# Patient Record
Sex: MALE | Race: BLACK OR AFRICAN AMERICAN | Employment: FULL TIME | URBAN - METROPOLITAN AREA
[De-identification: names, ages, dates, MRNs, and addresses within clinical notes are randomized per-mention and may not be internally consistent; named-entity substitution may affect disease eponyms.]

---

## 2017-06-06 ENCOUNTER — OFFICE VISIT (OUTPATIENT)
Dept: FAMILY MEDICINE CLINIC | Age: 27
End: 2017-06-06

## 2017-06-06 VITALS
RESPIRATION RATE: 18 BRPM | OXYGEN SATURATION: 98 % | SYSTOLIC BLOOD PRESSURE: 122 MMHG | TEMPERATURE: 98.5 F | WEIGHT: 248 LBS | BODY MASS INDEX: 34.72 KG/M2 | HEIGHT: 71 IN | HEART RATE: 85 BPM | DIASTOLIC BLOOD PRESSURE: 86 MMHG

## 2017-06-06 DIAGNOSIS — Z11.3 SCREENING FOR STD (SEXUALLY TRANSMITTED DISEASE): ICD-10-CM

## 2017-06-06 DIAGNOSIS — Z13.29 SCREENING FOR THYROID DISORDER: ICD-10-CM

## 2017-06-06 DIAGNOSIS — Z00.00 ENCOUNTER FOR ANNUAL PHYSICAL EXAM: Primary | ICD-10-CM

## 2017-06-06 NOTE — PROGRESS NOTES
Chief Complaint   Patient presents with   [address]     Patient in office today to [facility] care; have no c/o.    1. Have you been to the ER, urgent care clinic since your last visit? Hospitalized since your last visit? No    2. Have you seen or consulted any other health care providers outside of the [address] [place] [address] since your last visit? Include any pap smears or colon screening.  No

## 2017-06-06 NOTE — PROGRESS NOTES
Chief Complaint   Patient presents with   [address]     Patient in office today to [facility] care; have no c/o.  1. Have you been to the ER, urgent care clinic since your last visit? Hospitalized since your last visit? No    2. Have you seen or consulted any other health care providers outside of the [address] [place] [address] since your last visit? Include any pap smears or colon screening.  No

## 2017-06-06 NOTE — PROGRESS NOTES
Chief Complaint   Patient presents with   [facility].     Patient in office today to [facility] care; have no c/o.  1. Have you been to the ER, urgent care clinic since your last visit? Hospitalized since your last visit? No    2. Have you seen or consulted any other health care providers outside of the [facility] [place] since your last visit? Include any pap smears or colon screening. No    Pt is looking to establish care. Has not had a physical in some time. Pt works at [employer]. Pt has been trying to make diet changes. Denies any cp, sob, and dyspnea. Denies any ha or dizziness. Denies n/v/c/d. Denies any urinary sx. Denies any smoking. Drinks etoh in moderation, rarely. Last meal was about 1-2 hours ago. Denies any other concerns at this time. Chief Complaint   Patient presents with   [facility].     he is a 27y.o. year old male who presents for evalution. Reviewed PmHx, RxHx, FmHx, SocHx, AllgHx and updated and dated in the chart.     Review of Systems - negative except as listed above in the HPI    Objective:     Vitals:    06/06/17 1721   BP: 122/86   Pulse: 85   Resp: 18   Temp: 98.5 °F (36.9 °C)   TempSrc: Oral   SpO2: 98%   Weight: 248 lb (112.5 kg)   Height: 5' 10.5\" (1.791 m)     Physical Examination: General appearance - alert, well appearing, and in no distress  Mental status - normal mood, behavior, speech, dress, motor activity, and thought processes  Eyes - pupils equal and reactive, extraocular eye movements intact  Ears - bilateral TM's and external ear canals normal  Nose - normal and patent, no erythema, discharge or polyps and normal nontender sinuses  Mouth - mucous membranes moist, pharynx normal without lesions  Neck - supple, no significant adenopathy, carotids upstroke normal bilaterally, no bruits, thyroid exam: thyroid is normal in size without nodules or tenderness  Chest - clear to auscultation, no wheezes, rales or rhonchi, symmetric air entry  Heart - normal rate, regular rhythm, normal S1, S2, no murmurs  Extremities - peripheral pulses normal, no pedal edema, no clubbing or cyanosis  Skin - normal coloration and turgor, no rashes, no suspicious skin lesions noted    Assessment/ Plan:   Cristopher Connor was seen today for establish care. Diagnoses and all orders for this visit:    Encounter for annual physical exam  -     LIPID PANEL  -     METABOLIC PANEL, COMPREHENSIVE  -     CBC WITH AUTOMATED DIFF  RTC fasting for labs only. Will notify results and deviate plan based on findings. Enc to continue with efforts to follow a healthy diet and exercise routinely. Screening for thyroid disorder  -     TSH 3RD GENERATION  Screening, asx. Screening for STD (sexually transmitted disease)  -     HIV 1/2 AG/AB, 4TH GENERATION,W RFLX CONFIRM  -     RPR  -     CHLAMYDIA/GC AMPLIFICATON THINPREP  Screening, asx. Follow-up Disposition:  Return if symptoms worsen or fail to improve. I have discussed the diagnosis with the patient and the intended plan as seen in the above orders. The patient has received an after-visit summary and questions were answered concerning future plans. Medication Side Effects and Warnings were discussed with patient: yes  Patient Labs were reviewed and or requested: no  Patient Past Records were reviewed and or requested  yes  Patient / Caregiver Understanding of treatment plan was verbalized during office visit YES    PO White    There are no Patient Instructions on file for this visit.

## 2017-06-06 NOTE — MR AVS SNAPSHOT
Visit Information Date & Time Provider Department Dept. Phone Encounter #  
 6/6/2017  5:00 PM Sherrill Burkitt, NP [address] [facility] [phone] [id number] Follow-up Instructions Return if symptoms worsen or fail to improve. Upcoming Health Maintenance Date Due DTaP/Tdap/Td series (1 - Tdap) 12/22/2013 INFLUENZA AGE 9 TO ADULT 8/1/2017 Allergies as of 6/6/2017  Review Complete On: 6/6/2017 By: Sherrill Burkitt, NP No Known Allergies Current Immunizations  Never Reviewed No immunizations on file. Not reviewed this visit You Were Diagnosed With   
  
 Codes Comments Encounter for annual physical exam    -  Primary ICD-10-CM: Z00.00 ICD-9-CM: V70.0 Screening for thyroid disorder     ICD-10-CM: Z13.29 ICD-9-CM: V77.0 Vitals BP Pulse Temp Resp Height(growth percentile) Weight(growth percentile) 122/86 (BP 1 Location: Right arm, BP Patient Position: Sitting) 85 98.5 °F (36.9 °C) (Oral) 18 5' 10.5\" (1.791 m) 248 lb (112.5 kg) SpO2 BMI Smoking Status 98% 35.08 kg/m2 Former Smoker Vitals History BMI and BSA Data Body Mass Index Body Surface Area 35.08 kg/m 2 2.37 m 2 Preferred Pharmacy Pharmacy Name Phone [address] Your Updated Medication List  
  
Notice  As of 6/6/2017  5:36 PM  
 You have not been prescribed any medications. We Performed the Following CBC WITH AUTOMATED DIFF [51713 CPT(R)] LIPID PANEL [58877 CPT(R)] METABOLIC PANEL, COMPREHENSIVE [09679 CPT(R)] TSH 3RD GENERATION [14039 CPT(R)] Follow-up Instructions Return if symptoms worsen or fail to improve. Introducing [facility]! [facility] introduces [email or web address] patient portal. Now you can access parts of your medical record, email your doctor's office, and request medication refills online. 1. In your internet browser, go to [email or web address] 2. Click on the First Time User? Click Here link in the Sign In box. You will see the New Member Sign Up page. 3. Enter your Impulcity Access Code exactly as it appears below. You will not need to use this code after youve completed the sign-up process. If you do not sign up before the expiration date, you must request a new code. · Impulcity Access Code: [email or web address] Expires: 9/4/2017  5:23 PM 
 
4. Enter the last four digits of your Social Security Number (xxxx) and Date of Birth (mm/dd/yyyy) as indicated and click Submit. You will be taken to the next sign-up page. 5. Create a Playdemict ID. This will be your Impulcity login ID and cannot be changed, so think of one that is secure and easy to remember. 6. Create a Impulcity password. You can change your password at any time. 7. Enter your Password Reset Question and Answer. This can be used at a later time if you forget your password. 8. Enter your e-mail address. You will receive e-mail notification when new information is available in [address] 9. Click Sign Up. You can now view and download portions of your medical record. 10. Click the Download Summary menu link to download a portable copy of your medical information. If you have questions, please visit the Frequently Asked Questions section of the Impulcity website. Remember, Impulcity is NOT to be used for urgent needs. For medical emergencies, dial 911. Now available from your iPhone and Android! Please provide this summary of care documentation to your next provider. If you have any questions after today's visit, please call [phone].

## 2019-04-17 ENCOUNTER — OFFICE VISIT (OUTPATIENT)
Dept: FAMILY MEDICINE CLINIC | Age: 29
End: 2019-04-17

## 2019-04-17 VITALS
DIASTOLIC BLOOD PRESSURE: 87 MMHG | HEIGHT: 71 IN | WEIGHT: 263.1 LBS | HEART RATE: 83 BPM | BODY MASS INDEX: 36.83 KG/M2 | SYSTOLIC BLOOD PRESSURE: 125 MMHG | RESPIRATION RATE: 18 BRPM | OXYGEN SATURATION: 99 % | TEMPERATURE: 98.3 F

## 2019-04-17 DIAGNOSIS — J30.89 ENVIRONMENTAL AND SEASONAL ALLERGIES: Primary | ICD-10-CM

## 2019-04-17 RX ORDER — CETIRIZINE HCL 10 MG
10 TABLET ORAL DAILY
Qty: 90 TAB | Refills: 2 | Status: SHIPPED | OUTPATIENT
Start: 2019-04-17

## 2019-04-17 NOTE — PATIENT INSTRUCTIONS
Allergies: Care Instructions  Your Care Instructions    Allergies occur when your body's defense system (immune system) overreacts to certain substances. The immune system treats a harmless substance as if it were a harmful germ or virus. Many things can cause this overreaction, including pollens, medicine, food, dust, animal dander, and mold. Allergies can be mild or severe. Mild allergies can be managed with home treatment. But medicine may be needed to prevent problems. Managing your allergies is an important part of staying healthy. Your doctor may suggest that you have allergy testing to help find out what is causing your allergies. When you know what things trigger your symptoms, you can avoid them. This can prevent allergy symptoms and other health problems. For severe allergies that cause reactions that affect your whole body (anaphylactic reactions), your doctor may prescribe a shot of epinephrine to carry with you in case you have a severe reaction. Learn how to give yourself the shot and keep it with you at all times. Make sure it is not . Follow-up care is a key part of your treatment and safety. Be sure to make and go to all appointments, and call your doctor if you are having problems. It's also a good idea to know your test results and keep a list of the medicines you take. How can you care for yourself at home? · If you have been told by your doctor that dust or dust mites are causing your allergy, decrease the dust around your bed:  ? Wash sheets, pillowcases, and other bedding in hot water every week. ? Use dust-proof covers for pillows, duvets, and mattresses. Avoid plastic covers because they tear easily and do not \"breathe. \" Wash as instructed on the label. ? Do not use any blankets and pillows that you do not need. ? Use blankets that you can wash in your washing machine. ? Consider removing drapes and carpets, which attract and hold dust, from your bedroom.   · If you are allergic to house dust and mites, do not use home humidifiers. Your doctor can suggest ways you can control dust and mites. · Look for signs of cockroaches. Cockroaches cause allergic reactions. Use cockroach baits to get rid of them. Then, clean your home well. Cockroaches like areas where grocery bags, newspapers, empty bottles, or cardboard boxes are stored. Do not keep these inside your home, and keep trash and food containers sealed. Seal off any spots where cockroaches might enter your home. · If you are allergic to mold, get rid of furniture, rugs, and drapes that smell musty. Check for mold in the bathroom. · If you are allergic to outdoor pollen or mold spores, use air-conditioning. Change or clean all filters every month. Keep windows closed. · If you are allergic to pollen, stay inside when pollen counts are high. Use a vacuum  with a HEPA filter or a double-thickness filter at least two times each week. · Stay inside when air pollution is bad. Avoid paint fumes, perfumes, and other strong odors. · Avoid conditions that make your allergies worse. Stay away from smoke. Do not smoke or let anyone else smoke in your house. Do not use fireplaces or wood-burning stoves. · If you are allergic to your pets, change the air filter in your furnace every month. Use high-efficiency filters. · If you are allergic to pet dander, keep pets outside or out of your bedroom. Old carpet and cloth furniture can hold a lot of animal dander. You may need to replace them. When should you call for help? Give an epinephrine shot if:    · You think you are having a severe allergic reaction.     · You have symptoms in more than one body area, such as mild nausea and an itchy mouth.    After giving an epinephrine shot call 911, even if you feel better.   Call 911 if:    · You have symptoms of a severe allergic reaction. These may include:  ? Sudden raised, red areas (hives) all over your body. ?  Swelling of the throat, mouth, lips, or tongue. ? Trouble breathing. ? Passing out (losing consciousness). Or you may feel very lightheaded or suddenly feel weak, confused, or restless.     · You have been given an epinephrine shot, even if you feel better.    Call your doctor now or seek immediate medical care if:    · You have symptoms of an allergic reaction, such as:  ? A rash or hives (raised, red areas on the skin). ? Itching. ? Swelling. ? Belly pain, nausea, or vomiting.    Watch closely for changes in your health, and be sure to contact your doctor if:    · You do not get better as expected. Where can you learn more? Go to http://fausto-gayla.info/. Enter U573 in the search box to learn more about \"Allergies: Care Instructions. \"  Current as of: June 27, 2018  Content Version: 11.9  © 4318-6748 Healthwise, [employer]. Care instructions adapted under license by [employer] (which disclaims liability or warranty for this information). If you have questions about a medical condition or this instruction, always ask your healthcare professional. [address] any warranty or liability for your use of this information.

## 2019-04-17 NOTE — PROGRESS NOTES
Chief Complaint   Patient presents with    Headache     pt states, since pollen started up. Has not been taking anything OTC. Palpated sinus areas and slight pain. he is a 34y.o. year old male who presents for evalution. Pt states has been having bad headaches for past week. Mostly in frontal area. No fever or chills. Has not tried any OTCs. Course is constant. Reviewed PmHx, RxHx, FmHx, SocHx, AllgHx and updated and dated in the chart. Review of Systems - negative except as listed above in the HPI    Objective:     Vitals:    04/17/19 0851   BP: 125/87   Pulse: 83   Resp: 18   Temp: 98.3 °F (36.8 °C)   TempSrc: Oral   SpO2: 99%   Weight: 263 lb 1.6 oz (119.3 kg)   Height: 5' 10.5\" (1.791 m)     Physical Examination: General appearance - alert, well appearing, and in no distress  Ears - bilateral TM's and external ear canals normal  Nose - normal nontender sinuses, mucosal congestion and mucosal pallor  Mouth - mucous membranes moist, pharynx normal without lesions  Neck - supple, no significant adenopathy  Chest - clear to auscultation, no wheezes, rales or rhonchi, symmetric air entry  Heart - normal rate, regular rhythm, normal S1, S2, no murmurs, rubs, clicks or gallops    Assessment/ Plan:   Diagnoses and all orders for this visit:    1. Environmental and seasonal allergies  -     cetirizine (ZYRTEC) 10 mg tablet; Take 1 Tab by mouth daily. New rx. F/U prn     Pt voiced understanding regarding plan of care. Follow-up and Dispositions    · Return if symptoms worsen or fail to improve. I have discussed the diagnosis with the patient and the intended plan as seen in the above orders. The patient has received an after-visit summary and questions were answered concerning future plans.      Medication Side Effects and Warnings were discussed with patient    Jeffery Vital NP

## 2019-04-17 NOTE — PROGRESS NOTES
Identified pt with two pt identifiers(name and ). Reviewed record in preparation for visit and have obtained necessary documentation. All patient medications has been reviewed. Chief Complaint   Patient presents with    Headache     pt states, since pollen started up. Has not been taking anything OTC. Palpated sinus areas and slight pain. Health Maintenance Due   Topic    DTaP/Tdap/Td series (1 - Tdap)       Vitals:    19 0851   BP: 125/87   Pulse: 83   Resp: 18   Temp: 98.3 °F (36.8 °C)   TempSrc: Oral   SpO2: 99%   Weight: 263 lb 1.6 oz (119.3 kg)   Height: 5' 10.5\" (1.791 m)   PainSc:   0 - No pain       Coordination of Care Questionnaire:  :   1) Have you been to an emergency room, urgent care, or hospitalized since your last visit?   no       2. Have seen or consulted any other health care provider since your last visit? NO    3) Do you have an Advanced Directive/ Living Will in place? NO  If yes, do we have a copy on file NO  If no, would you like information NO    Patient is accompanied by self I have received verbal consent from Ebonie Schmidt to discuss any/all medical information while they are present in the room.

## 2020-10-13 ENCOUNTER — OFFICE VISIT (OUTPATIENT)
Dept: FAMILY MEDICINE CLINIC | Age: 30
End: 2020-10-13
Payer: COMMERCIAL

## 2020-10-13 VITALS
HEIGHT: 71 IN | BODY MASS INDEX: 37.94 KG/M2 | OXYGEN SATURATION: 99 % | HEART RATE: 87 BPM | RESPIRATION RATE: 16 BRPM | SYSTOLIC BLOOD PRESSURE: 138 MMHG | WEIGHT: 271 LBS | DIASTOLIC BLOOD PRESSURE: 83 MMHG | TEMPERATURE: 98.8 F

## 2020-10-13 DIAGNOSIS — E66.01 SEVERE OBESITY (HCC): ICD-10-CM

## 2020-10-13 DIAGNOSIS — I10 ESSENTIAL HYPERTENSION: ICD-10-CM

## 2020-10-13 DIAGNOSIS — Z13.1 SCREENING FOR DIABETES MELLITUS: ICD-10-CM

## 2020-10-13 DIAGNOSIS — Z13.6 SCREENING FOR CARDIOVASCULAR CONDITION: ICD-10-CM

## 2020-10-13 DIAGNOSIS — Z00.00 ANNUAL PHYSICAL EXAM: Primary | ICD-10-CM

## 2020-10-13 PROCEDURE — 99385 PREV VISIT NEW AGE 18-39: CPT | Performed by: FAMILY MEDICINE

## 2020-10-13 RX ORDER — ACETAMINOPHEN 500 MG
TABLET ORAL
Qty: 1 KIT | Refills: 0 | Status: SHIPPED | OUTPATIENT
Start: 2020-10-13

## 2020-10-13 NOTE — PATIENT INSTRUCTIONS
Low Sodium Diet (2,000 Milligram): Care Instructions  Your Care Instructions     Too much sodium causes your body to hold on to extra water. This can raise your blood pressure and force your heart and kidneys to work harder. In very serious cases, this could cause you to be put in the hospital. It might even be life-threatening. By limiting sodium, you will feel better and lower your risk of serious problems. The most common source of sodium is salt. People get most of the salt in their diet from canned, prepared, and packaged foods. Fast food and restaurant meals also are very high in sodium. Your doctor will probably limit your sodium to less than 2,000 milligrams (mg) a day. This limit counts all the sodium in prepared and packaged foods and any salt you add to your food. Follow-up care is a key part of your treatment and safety. Be sure to make and go to all appointments, and call your doctor if you are having problems. It's also a good idea to know your test results and keep a list of the medicines you take. How can you care for yourself at home? Read food labels  · Read labels on cans and food packages. The labels tell you how much sodium is in each serving. Make sure that you look at the serving size. If you eat more than the serving size, you have eaten more sodium. · Food labels also tell you the Percent Daily Value for sodium. Choose products with low Percent Daily Values for sodium. · Be aware that sodium can come in forms other than salt, including monosodium glutamate (MSG), sodium citrate, and sodium bicarbonate (baking soda). MSG is often added to Asian food. When you eat out, you can sometimes ask for food without MSG or added salt. Buy low-sodium foods  · Buy foods that are labeled \"unsalted\" (no salt added), \"sodium-free\" (less than 5 mg of sodium per serving), or \"low-sodium\" (less than 140 mg of sodium per serving).  Foods labeled \"reduced-sodium\" and \"light sodium\" may still have too much sodium. Be sure to read the label to see how much sodium you are getting. · Buy fresh vegetables, or frozen vegetables without added sauces. Buy low-sodium versions of canned vegetables, soups, and other canned goods. Prepare low-sodium meals  · Cut back on the amount of salt you use in cooking. This will help you adjust to the taste. Do not add salt after cooking. One teaspoon of salt has about 2,300 mg of sodium. · Take the salt shaker off the table. · Flavor your food with garlic, lemon juice, onion, vinegar, herbs, and spices. Do not use soy sauce, lite soy sauce, steak sauce, onion salt, garlic salt, celery salt, mustard, or ketchup on your food. · Use low-sodium salad dressings, sauces, and ketchup. Or make your own salad dressings and sauces without adding salt. · Use less salt (or none) when recipes call for it. You can often use half the salt a recipe calls for without losing flavor. Other foods such as rice, pasta, and grains do not need added salt. · Rinse canned vegetables, and cook them in fresh water. This removes some--but not all--of the salt. · Avoid water that is naturally high in sodium or that has been treated with water softeners, which add sodium. Call your local water company to find out the sodium content of your water supply. If you buy bottled water, read the label and choose a sodium-free brand. Avoid high-sodium foods  · Avoid eating:  ? Smoked, cured, salted, and canned meat, fish, and poultry. ? Ham, syed, hot dogs, and luncheon meats. ? Regular, hard, and processed cheese and regular peanut butter. ? Crackers with salted tops, and other salted snack foods such as pretzels, chips, and salted popcorn. ? Frozen prepared meals, unless labeled low-sodium. ? Canned and dried soups, broths, and bouillon, unless labeled sodium-free or low-sodium. ? Canned vegetables, unless labeled sodium-free or low-sodium. ? Western Karine fries, pizza, tacos, and other fast foods.   ? Daniela Marrow, olives, ketchup, and other condiments, especially soy sauce, unless labeled sodium-free or low-sodium. Where can you learn more? Go to http://www.gray.com/  Enter V843 in the search box to learn more about \"Low Sodium Diet (2,000 Milligram): Care Instructions. \"  Current as of: August 22, 2019               Content Version: 12.6  © 4311-8573 [employer]. Care instructions adapted under license by [employer] (which disclaims liability or warranty for this information). If you have questions about a medical condition or this instruction, always ask your healthcare professional. [address] any warranty or liability for your use of this information.

## 2020-10-13 NOTE — PROGRESS NOTES
Identified pt with two pt identifiers(name and ). Reviewed record in preparation for visit and have obtained necessary documentation. Chief Complaint   Patient presents with   [facility] Establish Care    Other     Discuss bp issues        Health Maintenance Due   Topic    DTaP/Tdap/Td series (1 - Tdap)    Flu Vaccine (1)   Pt declined flu vaccine    Coordination of Care Questionnaire:  :   1) Have you been to an emergency room, urgent care, or hospitalized since your last visit? If yes, where when, and reason for visit? Yes, [address] for High Blood Pressure      2. Have seen or consulted any other health care provider since your last visit? If yes, where when, and reason for visit?  no        Patient is accompanied by self I have received verbal consent from Jossue Laguna to discuss any/all medical information while they are present in the room.

## 2020-10-13 NOTE — PROGRESS NOTES
Darryl Rivero is a 34 y.o. male  Presenting for his annual checkup and health maintenance review and follow-up  Overall, doing well. Blood pressure concerns today. Last colonoscopy never. UTD on dental and vision. Not UTD on influenza. Patient is getting no exercise. Trying to eat a well balanced diet. Health Maintenance   Topic    DTaP/Tdap/Td series (1 - Tdap)    Flu Vaccine (1)    Pneumococcal 0-64 years        Health Maintenance reviewed        Vaccinations reviewed    There is no immunization history on file for this patient. History reviewed. No pertinent past medical history. has no past surgical history on file. Patient has no known allergies. Current Outpatient Medications   Medication Sig    Blood Pressure Test Kit-Large ([employer] Arm BP Monitor) kit Check bp daily    cetirizine (ZYRTEC) 10 mg tablet Take 1 Tab by mouth daily. No current facility-administered medications for this visit. SOCIAL HX:  reports that he has quit smoking. He has never used smokeless tobacco. He reports current alcohol use. He reports that he does not use drugs. FAMILY HX: family history includes Diabetes in his father; No Known Problems in his mother. Review of Systems   Constitutional: Negative. HENT: Negative. Eyes: Negative. Respiratory: Negative. Cardiovascular: Negative. Gastrointestinal: Negative. Genitourinary: Negative. Musculoskeletal: Negative. Skin: Negative. Neurological: Negative. Endo/Heme/Allergies: Negative. Psychiatric/Behavioral: Negative. Physical Exam  Vitals signs and nursing note reviewed. HENT:      Head: Normocephalic and atraumatic. Right Ear: Tympanic membrane, ear canal and external ear normal.      Left Ear: Tympanic membrane, ear canal and external ear normal.      Nose: Nose normal.      Mouth/Throat:      Pharynx: No oropharyngeal exudate. Eyes:      Extraocular Movements: Extraocular movements intact. Conjunctiva/sclera: Conjunctivae normal.      Pupils: Pupils are equal, round, and reactive to light. Neck:      Musculoskeletal: Normal range of motion and neck supple. Thyroid: No thyromegaly. Cardiovascular:      Rate and Rhythm: Normal rate and regular rhythm. Pulmonary:      Effort: Pulmonary effort is normal.      Breath sounds: Normal breath sounds. Abdominal:      General: Bowel sounds are normal. There is no distension. Palpations: Abdomen is soft. Tenderness: There is no abdominal tenderness. Musculoskeletal: Normal range of motion. Right lower leg: No edema. Left lower leg: No edema. Lymphadenopathy:      Cervical: No cervical adenopathy. Skin:     General: Skin is warm and dry. Neurological:      Mental Status: He is alert and oriented to person, place, and time. Psychiatric:         Mood and Affect: Mood and affect normal.            Assessment/Plan    1. Severe obesity ([address]      2. Annual physical exam    - LIPID PANEL; Future  - URINALYSIS W/ RFLX MICROSCOPIC; Future  - CBC WITH AUTOMATED DIFF; Future  - METABOLIC PANEL, COMPREHENSIVE; Future  - THYROID CASCADE PROFILE; Future    3. Screening for cardiovascular condition    - LIPID PANEL; Future    4. Screening for diabetes mellitus    - METABOLIC PANEL, COMPREHENSIVE; Future    5. Essential hypertension    Patient reports he went to [facility] ER 3 weeks ago to get COVID testing done. His blood pressure was checked there. Blood pressure reading was 187/85. He was told to follow-up with PCP. He does not monitor his blood pressure at home. His blood pressure today at the office is stable. Mr. Rupali Castillo has been given the following recommendations today due to his elevated BP reading: rescreen BP within a minimum of 2 weeks, lifestyle modifications to include: dietary sodium restriction and increase physical activity and lab tests ordered. - URINALYSIS W/ RFLX MICROSCOPIC;  Future  - METABOLIC PANEL, COMPREHENSIVE; Future  - THYROID CASCADE PROFILE; Future  - Blood Pressure Test Kit-Large ([employer] Arm BP Monitor) kit; Check bp daily  Dispense: 1 Kit; Refill: 0          34 y.o. male who presents today for annual exam. UTD on screening. Not UTD on vaccines. Will check routine labs. Encouraged daily exercise and trying to eat a well balanced diet. I have discussed the diagnosis with the patient and the intended plan as seen in the above orders. The patient has received an after-visit summary and questions were answered concerning future plans. I have discussed medication side effects and warnings with the patient as well. The patient agrees and understands above plan. Follow-up and Dispositions    · Return in about 2 weeks (around 10/27/2020) for BP.               Isael Moseley MD

## 2022-03-18 PROBLEM — E66.01 SEVERE OBESITY (HCC): Status: ACTIVE | Noted: 2020-10-13

## 2022-11-16 ENCOUNTER — NURSE TRIAGE (OUTPATIENT)
Dept: OTHER | Facility: CLINIC | Age: 32
End: 2022-11-16

## 2022-11-16 NOTE — TELEPHONE ENCOUNTER
Location of patient: [address] [facility]  call from [place] at [facility] with [employer]. Subjective: Caller states \"I have high blood pressure readings\"     Current Symptoms: BP yesterday was 153/111, when he randomly checked it. Not able to take BP at time of call. Headaches on and off. No headache, chest pain or vision changes. Currently asymptomatic. Does not take BP meds. Onset: several months ago; intermittent    Associated Symptoms: NA    Pain Severity: 0/10; ;     Temperature: denies fever     What has been tried:     LMP: NA Pregnant: NA    Recommended disposition: See in Office Today    Care advice provided, patient verbalizes understanding; denies any other questions or concerns; instructed to call back for any new or worsening symptoms. Patient/Caller agrees with recommended disposition; writer provided warm transfer to [facility] at [facility] for appointment scheduling    Attention Provider: Thank you for allowing me to participate in the care of your patient. The patient was connected to triage in response to information provided to the [facility]. Please do not respond through this encounter as the response is not directed to a shared pool.     Reason for Disposition   Systolic BP >= 428 OR Diastolic >= 363    Protocols used: Blood Pressure - High-ADULT-[place]

## 2022-11-18 ENCOUNTER — OFFICE VISIT (OUTPATIENT)
Dept: FAMILY MEDICINE CLINIC | Age: 32
End: 2022-11-18
Payer: COMMERCIAL

## 2022-11-18 VITALS
WEIGHT: 268 LBS | BODY MASS INDEX: 37.52 KG/M2 | HEIGHT: 71 IN | DIASTOLIC BLOOD PRESSURE: 108 MMHG | HEART RATE: 100 BPM | SYSTOLIC BLOOD PRESSURE: 160 MMHG | OXYGEN SATURATION: 98 % | TEMPERATURE: 97 F | RESPIRATION RATE: 16 BRPM

## 2022-11-18 DIAGNOSIS — G47.30 SLEEP APNEA, UNSPECIFIED TYPE: ICD-10-CM

## 2022-11-18 DIAGNOSIS — E66.9 OBESITY (BMI 30-39.9): ICD-10-CM

## 2022-11-18 DIAGNOSIS — I10 ESSENTIAL HYPERTENSION: Primary | ICD-10-CM

## 2022-11-18 PROCEDURE — 3079F DIAST BP 80-89 MM HG: CPT | Performed by: STUDENT IN AN ORGANIZED HEALTH CARE EDUCATION/TRAINING PROGRAM

## 2022-11-18 PROCEDURE — 99214 OFFICE O/P EST MOD 30 MIN: CPT | Performed by: STUDENT IN AN ORGANIZED HEALTH CARE EDUCATION/TRAINING PROGRAM

## 2022-11-18 PROCEDURE — 3074F SYST BP LT 130 MM HG: CPT | Performed by: STUDENT IN AN ORGANIZED HEALTH CARE EDUCATION/TRAINING PROGRAM

## 2022-11-18 RX ORDER — AMLODIPINE BESYLATE 5 MG/1
5 TABLET ORAL DAILY
Qty: 30 TABLET | Refills: 1 | Status: SHIPPED | OUTPATIENT
Start: 2022-11-18

## 2022-11-18 NOTE — PROGRESS NOTES
Assessment/Plan:     Diagnoses and all orders for this visit:    1. Essential hypertension  -     SLEEP MEDICINE REFERRAL  -     amLODIPine (NORVASC) 5 mg tablet; Take 1 Tablet by mouth daily.  -     MICROALBUMIN, UR, RAND W/ MICROALB/CREAT RATIO; Future  -     METABOLIC PANEL, COMPREHENSIVE; Future  -     CBC W/O DIFF; Future  -     TSH 3RD GENERATION; Future  -Patient has had a previous elevated blood pressure reading as well as elevated readings at the dentist office and at home, along with an elevated reading today. Therefore consistent with essential hypertension.  -Given how significantly elevated blood pressure is today will start amlodipine 5 mg daily.  -Patient is currently asymptomatic.  -Check routine lab work today.  -Also suspect patient likely has sleep apnea and this can contribute to his hypertension. Therefore referred to sleep medicine  -Follow-up in about 2 to 3 weeks for reevaluation or sooner if needed    2. Obesity (BMI 30-39.9)  -     LIPID PANEL; Future  -     HEMOGLOBIN A1C WITH EAG; Future  -Encouraged dietary and lifestyle modifications to promote weight loss    3. Sleep apnea, unspecified type  -     SLEEP MEDICINE REFERRAL  -Patient has been having some brief morning time headaches intermittently along with symptoms consistent with sleep apnea. -Refer to sleep medicine for a sleep study. Follow-up and Dispositions    Return in about 3 weeks (around 12/9/2022), or if symptoms worsen or fail to improve. Discussed expected course/resolution/complications of diagnosis in detail with patient. Medication risks/benefits/costs/interactions/alternatives discussed with patient. Pt expressed understanding with the diagnosis and plan. Subjective:      Naya Santana is a 36 y.o. male who presents for had concerns including Elevated Blood Pressure and Headache (Patient report waking up with Headaches. ).      Current Outpatient Medications   Medication Sig Dispense Refill amLODIPine (NORVASC) 5 mg tablet Take 1 Tablet by mouth daily. 30 Tablet 1       No Known Allergies  History reviewed. No pertinent past medical history. No past surgical history on file. Family History   Problem Relation Age of Onset    Hypertension Mother     Diabetes Father      Social History     Socioeconomic History    Marital status: SINGLE     Spouse name: Not on file    Number of children: Not on file    Years of education: Not on file    Highest education level: Not on file   Occupational History    Not on file   Tobacco Use    Smoking status: Former    Smokeless tobacco: Never   Substance and Sexual Activity    Alcohol use: Not Currently     Comment: socially    Drug use: No    Sexual activity: Not Currently   Other Topics Concern    Not on file   Social History Narrative    Not on file     Social Determinants of Health     Financial Resource Strain: Not on file   Food Insecurity: Not on file   Transportation Needs: Not on file   Physical Activity: Not on file   Stress: Not on file   Social Connections: Not on file   Intimate Partner Violence: Not on file   Housing Stability: Not on file       Patient is a 72-year-old male who presents to the office today due to concerns of elevated blood pressure readings. Patient states that at his last doctor's appointment 2 years ago he did initially have an elevated blood pressure reading that decreased. He did also endorse an elevated blood pressure reading at his dentist office 2 months ago. Also his girlfriend had checked his blood pressure recently and was noted to be elevated at home. He does also endorse some morning headaches. He states that these do not occur every morning but occasionally and usually only last about 15 minutes. He does state that he gets poor sleep and has been snoring and his girlfriend has noted apnea events. He does not wake up feeling refreshed.   He denies any current chest pain, headache, dizziness, numbness, tingling or weakness. He states when he has the headache it resolves quickly and has no other neurological signs or symptoms. ROS:   Review of Systems   Constitutional:  Negative for chills and fever. HENT:  Negative for congestion. Respiratory:  Negative for cough and shortness of breath. Cardiovascular:  Negative for chest pain and leg swelling. Gastrointestinal:  Negative for abdominal pain, nausea and vomiting. Neurological:  Positive for headaches. Negative for dizziness, tingling and weakness. Objective:   Visit Vitals  BP (!) 160/108   Pulse 100   Temp 97 °F (36.1 °C) (Skin)   Resp 16   Ht 5' 11\" (1.803 m)   Wt 268 lb (121.6 kg)   SpO2 98%   BMI 37.38 kg/m²         Vitals and Nurse Documentation reviewed. Physical Exam  Constitutional:       General: He is not in acute distress. Appearance: Normal appearance. He is obese. He is not toxic-appearing. HENT:      Head: Normocephalic and atraumatic. Nose: Nose normal.      Mouth/Throat:      Mouth: Mucous membranes are moist.      Pharynx: Oropharynx is clear. No oropharyngeal exudate or posterior oropharyngeal erythema. Eyes:      Extraocular Movements: Extraocular movements intact. Conjunctiva/sclera: Conjunctivae normal.      Pupils: Pupils are equal, round, and reactive to light. Cardiovascular:      Rate and Rhythm: Normal rate and regular rhythm. Pulses: Normal pulses. Heart sounds: Normal heart sounds. No murmur heard. No gallop. Pulmonary:      Effort: Pulmonary effort is normal. No respiratory distress. Breath sounds: Normal breath sounds. No wheezing or rhonchi. Abdominal:      General: Bowel sounds are normal. There is no distension. Palpations: Abdomen is soft. Tenderness: There is no abdominal tenderness. There is no guarding. Musculoskeletal:      Cervical back: Neck supple. Right lower leg: No edema. Left lower leg: No edema.    Neurological:      General: No focal deficit present. Mental Status: He is alert and oriented to person, place, and time. Cranial Nerves: Cranial nerves 2-12 are intact. Sensory: Sensation is intact. Motor: Motor function is intact. Coordination: Coordination is intact. Coordination normal. Finger-Nose-Finger Test and Heel to Rush Code Test normal.      Gait: Gait is intact. Gait normal.      Deep Tendon Reflexes:      Reflex Scores:       Bicep reflexes are 2+ on the right side and 2+ on the left side. Patellar reflexes are 2+ on the right side and 2+ on the left side. No results found for this or any previous visit.

## 2022-11-18 NOTE — PROGRESS NOTES
1. Have you been to the ER, urgent care clinic since your last visit? Hospitalized since your last visit? No    2. Have you seen or consulted any other health care providers outside of the [address] [place] [address] since your last visit? Include any pap smears or colon screening. No  Chief Complaint   Patient presents with    Elevated Blood Pressure    Headache     Patient report waking up with Headaches. Health Maintenance Due   Topic Date Due    Hepatitis C Screening  Never done    Depression Screen  Never done    COVID-19 Vaccine (1) Never done    DTaP/Tdap/Td series (1 - Tdap) Never done    Flu Vaccine (1) Never done     3 most recent PHQ Screens 11/18/2022   Little interest or pleasure in doing things Not at all   Feeling down, depressed, irritable, or hopeless Not at all   Total Score PHQ 2 0     Abuse Screening Questionnaire 11/18/2022   Do you ever feel afraid of your partner? N   Are you in a relationship with someone who physically or mentally threatens you? N   Is it safe for you to go home?  Y     Visit Vitals  BP (!) 153/101 (BP 1 Location: Left upper arm, BP Patient Position: Sitting, BP Cuff Size: Adult)   Pulse 100   Temp 97 °F (36.1 °C) (Skin)   Resp 16   Ht 5' 11\" (1.803 m)   Wt 268 lb (121.6 kg)   SpO2 98%   BMI 37.38 kg/m²

## 2022-11-23 PROBLEM — R73.03 PREDIABETES: Status: ACTIVE | Noted: 2022-11-23

## 2022-11-23 PROBLEM — I10 ESSENTIAL HYPERTENSION: Status: ACTIVE | Noted: 2022-11-23

## 2022-11-23 PROBLEM — E78.00 HYPERCHOLESTEROLEMIA: Status: ACTIVE | Noted: 2022-11-23

## 2022-12-08 ENCOUNTER — OFFICE VISIT (OUTPATIENT)
Dept: FAMILY MEDICINE CLINIC | Age: 32
End: 2022-12-08
Payer: COMMERCIAL

## 2022-12-08 ENCOUNTER — PATIENT MESSAGE (OUTPATIENT)
Dept: SLEEP MEDICINE | Age: 32
End: 2022-12-08

## 2022-12-08 VITALS
DIASTOLIC BLOOD PRESSURE: 86 MMHG | BODY MASS INDEX: 37.71 KG/M2 | WEIGHT: 270.4 LBS | HEART RATE: 103 BPM | TEMPERATURE: 97.9 F | SYSTOLIC BLOOD PRESSURE: 138 MMHG | RESPIRATION RATE: 16 BRPM | OXYGEN SATURATION: 97 %

## 2022-12-08 DIAGNOSIS — I10 ESSENTIAL HYPERTENSION: Primary | ICD-10-CM

## 2022-12-08 DIAGNOSIS — E66.01 SEVERE OBESITY (HCC): ICD-10-CM

## 2022-12-08 PROCEDURE — 3074F SYST BP LT 130 MM HG: CPT | Performed by: STUDENT IN AN ORGANIZED HEALTH CARE EDUCATION/TRAINING PROGRAM

## 2022-12-08 PROCEDURE — 99213 OFFICE O/P EST LOW 20 MIN: CPT | Performed by: STUDENT IN AN ORGANIZED HEALTH CARE EDUCATION/TRAINING PROGRAM

## 2022-12-08 PROCEDURE — 3079F DIAST BP 80-89 MM HG: CPT | Performed by: STUDENT IN AN ORGANIZED HEALTH CARE EDUCATION/TRAINING PROGRAM

## 2022-12-08 RX ORDER — AMLODIPINE BESYLATE 5 MG/1
5 TABLET ORAL DAILY
Qty: 90 TABLET | Refills: 1 | Status: SHIPPED | OUTPATIENT
Start: 2022-12-08

## 2022-12-08 NOTE — PROGRESS NOTES
1. Have you been to the ER, urgent care clinic since your last visit? Hospitalized since your last visit? No    2. Have you seen or consulted any other health care providers outside of the [address] [place] [address] since your last visit? Include any pap smears or colon screening.  No        Chief Complaint   Patient presents with    Follow-up    Other     Wants to get enrolled in the weight management program

## 2022-12-08 NOTE — PROGRESS NOTES
Assessment/Plan:     Diagnoses and all orders for this visit:    1. Essential hypertension  -     amLODIPine (NORVASC) 5 mg tablet; Take 1 Tablet by mouth daily.  -Chronic. Well-controlled. -Continue on current dose of amlodipine 5 mg daily.  -Recommend follow-up in 3 to 4 months for reevaluation or sooner if needed    2. Severe obesity ([address]  -     REFERRAL TO WEIGHT LOSS  -Encouraged continued dietary and lifestyle modifications.  -Patient is interested in weight loss management referral.  Referral placed      Follow-up and Dispositions    Return in about 4 months (around 4/8/2023), or if symptoms worsen or fail to improve. Discussed expected course/resolution/complications of diagnosis in detail with patient. Medication risks/benefits/costs/interactions/alternatives discussed with patient. Pt expressed understanding with the diagnosis and plan. Subjective:      Miguel Howard is a 36 y.o. male who presents for had concerns including Follow-up and Other (Wants to get enrolled in the weight management program ). Current Outpatient Medications   Medication Sig Dispense Refill    amLODIPine (NORVASC) 5 mg tablet Take 1 Tablet by mouth daily. 90 Tablet 1       No Known Allergies  No past medical history on file. No past surgical history on file.   Family History   Problem Relation Age of Onset    Hypertension Mother     Diabetes Father      Social History     Socioeconomic History    Marital status: SINGLE     Spouse name: Not on file    Number of children: Not on file    Years of education: Not on file    Highest education level: Not on file   Occupational History    Not on file   Tobacco Use    Smoking status: Former    Smokeless tobacco: Never   Substance and Sexual Activity    Alcohol use: Not Currently     Comment: socially    Drug use: No    Sexual activity: Not Currently   Other Topics Concern    Not on file   Social History Narrative    Not on file     Social Determinants of Health Financial Resource Strain: Not on file   Food Insecurity: Not on file   Transportation Needs: Not on file   Physical Activity: Not on file   Stress: Not on file   Social Connections: Not on file   Intimate Partner Violence: Not on file   Housing Stability: Not on file       Patient is a 82-year-old male who presents the office today for follow-up of hypertension. Patient was recently started on amlodipine 5 mg daily at last office visit. Patient states that he has been checking his home blood pressure and usually notes about 982S systolic and less than 31S diastolic. Patient also states he is interested in possible weight loss management referral as he would like to work on losing weight to help improve his BP. He denies any acute concerns today. ROS:   Review of Systems   Constitutional:  Negative for chills and fever. HENT:  Negative for congestion. Respiratory:  Negative for cough and shortness of breath. Cardiovascular:  Negative for chest pain and leg swelling. Gastrointestinal:  Negative for abdominal pain, nausea and vomiting. Neurological:  Negative for dizziness, tingling, weakness and headaches. Objective:   Visit Vitals  /86   Pulse (!) 103   Temp 97.9 °F (36.6 °C) (Temporal)   Resp 16   Wt 270 lb 6.4 oz (122.7 kg)   SpO2 97%   BMI 37.71 kg/m²         Vitals and Nurse Documentation reviewed. Physical Exam  Constitutional:       General: He is not in acute distress. Appearance: Normal appearance. He is obese. He is not toxic-appearing. HENT:      Head: Normocephalic and atraumatic. Eyes:      Conjunctiva/sclera: Conjunctivae normal.   Cardiovascular:      Rate and Rhythm: Normal rate and regular rhythm. Pulses: Normal pulses. Heart sounds: Normal heart sounds. No murmur heard. No gallop. Pulmonary:      Effort: Pulmonary effort is normal. No respiratory distress. Breath sounds: Normal breath sounds. No wheezing or rhonchi.    Abdominal: General: Bowel sounds are normal. There is no distension. Palpations: Abdomen is soft. Tenderness: There is no abdominal tenderness. There is no guarding. Musculoskeletal:      Right lower leg: No edema. Left lower leg: No edema. Neurological:      Mental Status: He is alert and oriented to person, place, and time. Gait: Gait normal.       Results for orders placed or performed in visit on 11/21/22   HEMOGLOBIN A1C WITH EAG   Result Value Ref Range    Hemoglobin A1c 5.7 (H) 4.0 - 5.6 %    Est. average glucose 117 mg/dL   TSH 3RD GENERATION   Result Value Ref Range    TSH 1.07 0.36 - 3.74 uIU/mL   CBC W/O DIFF   Result Value Ref Range    WBC 5.2 4.1 - 11.1 K/uL    RBC 5.43 4.10 - 5.70 M/uL    HGB 15.3 12.1 - 17.0 g/dL    HCT 47.6 36.6 - 50.3 %    MCV 87.7 80.0 - 99.0 FL    MCH 28.2 26.0 - 34.0 PG    MCHC 32.1 30.0 - 36.5 g/dL    RDW 13.7 11.5 - 14.5 %    PLATELET 308 884 - 721 K/uL    MPV 10.1 8.9 - 12.9 FL    NRBC 0.0 0  WBC    ABSOLUTE NRBC 0.00 0.00 - 6.96 K/uL   METABOLIC PANEL, COMPREHENSIVE   Result Value Ref Range    Sodium 138 136 - 145 mmol/L    Potassium 4.5 3.5 - 5.1 mmol/L    Chloride 104 97 - 108 mmol/L    CO2 30 21 - 32 mmol/L    Anion gap 4 (L) 5 - 15 mmol/L    Glucose 84 65 - 100 mg/dL    BUN 10 6 - 20 MG/DL    Creatinine 1.01 0.70 - 1.30 MG/DL    BUN/Creatinine ratio 10 (L) 12 - 20      eGFR >60 >60 ml/min/1.73m2    Calcium 9.7 8.5 - 10.1 MG/DL    Bilirubin, total 0.7 0.2 - 1.0 MG/DL    ALT (SGPT) 55 12 - 78 U/L    AST (SGOT) 36 15 - 37 U/L    Alk.  phosphatase 61 45 - 117 U/L    Protein, total 8.2 6.4 - 8.2 g/dL    Albumin 4.5 3.5 - 5.0 g/dL    Globulin 3.7 2.0 - 4.0 g/dL    A-G Ratio 1.2 1.1 - 2.2     MICROALBUMIN, UR, RAND W/ MICROALB/CREAT RATIO   Result Value Ref Range    Microalbumin,urine random 5.04 MG/DL    Creatinine, urine random 250.00 mg/dL    Microalbumin/Creat ratio (mg/g creat) 20 0 - 30 mg/g   LIPID PANEL   Result Value Ref Range    Cholesterol, total 227 (H) <200 MG/DL    Triglyceride 80 <150 MG/DL    HDL Cholesterol 47 MG/DL    LDL, calculated 164 (H) 0 - 100 MG/DL    VLDL, calculated 16 MG/DL    CHOL/HDL Ratio 4.8 0.0 - 5.0

## 2022-12-09 ENCOUNTER — TELEPHONE (OUTPATIENT)
Dept: SURGERY | Age: 32
End: 2022-12-09

## 2022-12-09 NOTE — TELEPHONE ENCOUNTER
Called patient regarding referral to our [address] [place] [address] [facility]. Patient did not answer so I left a vmail with our contact information.

## 2022-12-12 ENCOUNTER — PATIENT MESSAGE (OUTPATIENT)
Dept: SLEEP MEDICINE | Age: 32
End: 2022-12-12

## 2022-12-14 ENCOUNTER — TELEPHONE (OUTPATIENT)
Dept: SLEEP MEDICINE | Age: 32
End: 2022-12-14

## 2023-01-03 ENCOUNTER — OFFICE VISIT (OUTPATIENT)
Dept: FAMILY MEDICINE CLINIC | Age: 33
End: 2023-01-03
Payer: COMMERCIAL

## 2023-01-03 VITALS
HEART RATE: 91 BPM | OXYGEN SATURATION: 98 % | BODY MASS INDEX: 37.94 KG/M2 | WEIGHT: 271 LBS | DIASTOLIC BLOOD PRESSURE: 88 MMHG | TEMPERATURE: 97.8 F | HEIGHT: 71 IN | SYSTOLIC BLOOD PRESSURE: 144 MMHG | RESPIRATION RATE: 16 BRPM

## 2023-01-03 DIAGNOSIS — E66.9 OBESITY (BMI 30-39.9): Primary | ICD-10-CM

## 2023-01-03 PROCEDURE — 3077F SYST BP >= 140 MM HG: CPT | Performed by: STUDENT IN AN ORGANIZED HEALTH CARE EDUCATION/TRAINING PROGRAM

## 2023-01-03 PROCEDURE — 99213 OFFICE O/P EST LOW 20 MIN: CPT | Performed by: STUDENT IN AN ORGANIZED HEALTH CARE EDUCATION/TRAINING PROGRAM

## 2023-01-03 PROCEDURE — 3079F DIAST BP 80-89 MM HG: CPT | Performed by: STUDENT IN AN ORGANIZED HEALTH CARE EDUCATION/TRAINING PROGRAM

## 2023-01-03 RX ORDER — TOPIRAMATE 25 MG/1
25 TABLET ORAL 2 TIMES DAILY
Qty: 60 TABLET | Refills: 1 | Status: SHIPPED | OUTPATIENT
Start: 2023-01-03

## 2023-01-03 NOTE — PROGRESS NOTES
Assessment/Plan:     Diagnoses and all orders for this visit:    1. Obesity (BMI 30-39.9)  -     topiramate (TOPAMAX) 25 mg tablet; Take 1 Tablet by mouth two (2) times a day. Take one tab at bedtime for 1 week then increase to two time per day thereafter  -Patient desires assistance with medication management for weight loss  -Encouraged continued work on his dietary and lifestyle modifications  -BMI currently 37.8 with a history of hypertension, hypercholesterolemia and prediabetes  -Patient declines any injectable medication at this time. Also will try to avoid phentermine in the setting of hypertension  -Therefore will trial Topamax twice daily to see if this provides benefit with weight loss. Discussed medication risks and side effects.  -Follow-up in 6 to 8 weeks for reevaluation or sooner if needed      Follow-up and Dispositions    Return in about 6 weeks (around 2/14/2023), or if symptoms worsen or fail to improve. Discussed expected course/resolution/complications of diagnosis in detail with patient. Medication risks/benefits/costs/interactions/alternatives discussed with patient. Pt expressed understanding with the diagnosis and plan. Subjective:      Letty Ozuna is a 29 y.o. male who presents for had concerns including Weight Loss (Follow up//). Current Outpatient Medications   Medication Sig Dispense Refill    topiramate (TOPAMAX) 25 mg tablet Take 1 Tablet by mouth two (2) times a day. Take one tab at bedtime for 1 week then increase to two time per day thereafter 60 Tablet 1    amLODIPine (NORVASC) 5 mg tablet Take 1 Tablet by mouth daily. 90 Tablet 1       No Known Allergies  History reviewed. No pertinent past medical history. No past surgical history on file.   Family History   Problem Relation Age of Onset    Hypertension Mother     Diabetes Father      Social History     Socioeconomic History    Marital status: SINGLE     Spouse name: Not on file    Number of children: Not on file    Years of education: Not on file    Highest education level: Not on file   Occupational History    Not on file   Tobacco Use    Smoking status: Former    Smokeless tobacco: Never   Substance and Sexual Activity    Alcohol use: Not Currently     Comment: socially    Drug use: No    Sexual activity: Not Currently   Other Topics Concern    Not on file   Social History Narrative    Not on file     Social Determinants of Health     Financial Resource Strain: Not on file   Food Insecurity: Not on file   Transportation Needs: Not on file   Physical Activity: Not on file   Stress: Not on file   Social Connections: Not on file   Intimate Partner Violence: Not on file   Housing Stability: Not on file       Patient is a 53-year-old male who presents to the office today for discussion of weight loss medication management. Patient has a new diagnosis of hypertension as well as prediabetes and hypercholesterolemia. He has been trying to work on dietary changes and cutting out a majority of his sugar, eating more balanced meal and attempting to become more physically active. He does desire assistance in trying to lose weight in order to also help improve his chronic medical conditions. Patient denies any other acute concerns today. He does not desire any injectable medications at this time. Of note his home blood pressure readings are usually in the 525Y systolic and 19N diastolic. ROS:   Review of Systems   Constitutional:  Negative for chills and fever. HENT:  Negative for congestion. Respiratory:  Negative for cough and shortness of breath. Cardiovascular:  Negative for chest pain and leg swelling. Gastrointestinal:  Negative for abdominal pain, nausea and vomiting. Neurological:  Negative for dizziness, tingling, weakness and headaches.      Objective:   Visit Vitals  BP (!) 144/88 (BP 1 Location: Left upper arm, BP Patient Position: Sitting, BP Cuff Size: Adult)   Pulse 91   Temp 97.8 °F (36.6 °C) (Skin)   Resp 16   Ht 5' 11\" (1.803 m)   Wt 271 lb (122.9 kg)   SpO2 98%   BMI 37.80 kg/m²         Vitals and Nurse Documentation reviewed. Physical Exam  Constitutional:       General: He is not in acute distress. Appearance: Normal appearance. He is obese. He is not toxic-appearing. HENT:      Head: Normocephalic and atraumatic. Eyes:      Conjunctiva/sclera: Conjunctivae normal.   Cardiovascular:      Rate and Rhythm: Normal rate and regular rhythm. Pulses: Normal pulses. Heart sounds: Normal heart sounds. No murmur heard. No gallop. Pulmonary:      Effort: Pulmonary effort is normal. No respiratory distress. Breath sounds: Normal breath sounds. No wheezing or rhonchi. Abdominal:      General: Bowel sounds are normal. There is no distension. Palpations: Abdomen is soft. Tenderness: There is no abdominal tenderness. There is no guarding. Musculoskeletal:      Right lower leg: No edema. Left lower leg: No edema. Neurological:      Mental Status: He is alert and oriented to person, place, and time.       Gait: Gait normal.       Results for orders placed or performed in visit on 11/21/22   HEMOGLOBIN A1C WITH EAG   Result Value Ref Range    Hemoglobin A1c 5.7 (H) 4.0 - 5.6 %    Est. average glucose 117 mg/dL   TSH 3RD GENERATION   Result Value Ref Range    TSH 1.07 0.36 - 3.74 uIU/mL   CBC W/O DIFF   Result Value Ref Range    WBC 5.2 4.1 - 11.1 K/uL    RBC 5.43 4.10 - 5.70 M/uL    HGB 15.3 12.1 - 17.0 g/dL    HCT 47.6 36.6 - 50.3 %    MCV 87.7 80.0 - 99.0 FL    MCH 28.2 26.0 - 34.0 PG    MCHC 32.1 30.0 - 36.5 g/dL    RDW 13.7 11.5 - 14.5 %    PLATELET 350 658 - 880 K/uL    MPV 10.1 8.9 - 12.9 FL    NRBC 0.0 0  WBC    ABSOLUTE NRBC 0.00 0.00 - 0.77 K/uL   METABOLIC PANEL, COMPREHENSIVE   Result Value Ref Range    Sodium 138 136 - 145 mmol/L    Potassium 4.5 3.5 - 5.1 mmol/L    Chloride 104 97 - 108 mmol/L    CO2 30 21 - 32 mmol/L    Anion gap 4 (L) 5 - 15 mmol/L    Glucose 84 65 - 100 mg/dL    BUN 10 6 - 20 MG/DL    Creatinine 1.01 0.70 - 1.30 MG/DL    BUN/Creatinine ratio 10 (L) 12 - 20      eGFR >60 >60 ml/min/1.73m2    Calcium 9.7 8.5 - 10.1 MG/DL    Bilirubin, total 0.7 0.2 - 1.0 MG/DL    ALT (SGPT) 55 12 - 78 U/L    AST (SGOT) 36 15 - 37 U/L    Alk.  phosphatase 61 45 - 117 U/L    Protein, total 8.2 6.4 - 8.2 g/dL    Albumin 4.5 3.5 - 5.0 g/dL    Globulin 3.7 2.0 - 4.0 g/dL    A-G Ratio 1.2 1.1 - 2.2     MICROALBUMIN, UR, RAND W/ MICROALB/CREAT RATIO   Result Value Ref Range    Microalbumin,urine random 5.04 MG/DL    Creatinine, urine random 250.00 mg/dL    Microalbumin/Creat ratio (mg/g creat) 20 0 - 30 mg/g   LIPID PANEL   Result Value Ref Range    Cholesterol, total 227 (H) <200 MG/DL    Triglyceride 80 <150 MG/DL    HDL Cholesterol 47 MG/DL    LDL, calculated 164 (H) 0 - 100 MG/DL    VLDL, calculated 16 MG/DL    CHOL/HDL Ratio 4.8 0.0 - 5.0

## 2023-01-03 NOTE — PROGRESS NOTES
1. Have you been to the ER, urgent care clinic since your last visit? Hospitalized since your last visit? No    2. Have you seen or consulted any other health care providers outside of the [address] [place] [address] since your last visit? Include any pap smears or colon screening. No    Chief Complaint   Patient presents with    Weight Loss     Follow up         Health Maintenance Due   Topic Date Due    Hepatitis C Screening  Never done    COVID-19 Vaccine (1) Never done    DTaP/Tdap/Td series (1 - Tdap) Never done    Flu Vaccine (1) Never done     3 most recent PHQ Screens 1/3/2023   Little interest or pleasure in doing things Not at all   Feeling down, depressed, irritable, or hopeless Not at all   Total Score PHQ 2 0     Abuse Screening Questionnaire 1/3/2023   Do you ever feel afraid of your partner? N   Are you in a relationship with someone who physically or mentally threatens you? N   Is it safe for you to go home?  Y     Visit Vitals  BP (!) 141/88 (BP 1 Location: Left upper arm, BP Patient Position: Sitting, BP Cuff Size: Adult)   Pulse 91   Temp 97.8 °F (36.6 °C) (Skin)   Resp 16   Ht 5' 11\" (1.803 m)   Wt 271 lb (122.9 kg)   SpO2 98%   BMI 37.80 kg/m²

## 2023-01-06 ENCOUNTER — TELEPHONE (OUTPATIENT)
Dept: SURGERY | Age: 33
End: 2023-01-06

## 2023-01-06 NOTE — TELEPHONE ENCOUNTER
Called patient regarding referral to our [address] [place] [address] [facility]. Patient did not answer so I left a vmail with our contact information.

## 2023-02-06 ENCOUNTER — TELEPHONE (OUTPATIENT)
Dept: SURGERY | Age: 33
End: 2023-02-06

## 2023-02-06 NOTE — TELEPHONE ENCOUNTER
Called patient regarding referral to our [address] [place] [address] [facility]. Patient did not answer so I left a vmail with our contact information.

## 2023-02-23 ENCOUNTER — OFFICE VISIT (OUTPATIENT)
Dept: FAMILY MEDICINE CLINIC | Age: 33
End: 2023-02-23
Payer: COMMERCIAL

## 2023-02-23 VITALS
HEART RATE: 91 BPM | BODY MASS INDEX: 37.28 KG/M2 | RESPIRATION RATE: 16 BRPM | DIASTOLIC BLOOD PRESSURE: 97 MMHG | WEIGHT: 260.4 LBS | TEMPERATURE: 97.9 F | SYSTOLIC BLOOD PRESSURE: 149 MMHG | HEIGHT: 70 IN | OXYGEN SATURATION: 98 %

## 2023-02-23 DIAGNOSIS — E66.01 SEVERE OBESITY (HCC): ICD-10-CM

## 2023-02-23 DIAGNOSIS — E78.2 MIXED HYPERLIPIDEMIA: ICD-10-CM

## 2023-02-23 DIAGNOSIS — E66.9 OBESITY (BMI 30-39.9): ICD-10-CM

## 2023-02-23 DIAGNOSIS — Z71.3 WEIGHT LOSS COUNSELING, ENCOUNTER FOR: ICD-10-CM

## 2023-02-23 DIAGNOSIS — I10 ESSENTIAL HYPERTENSION: Primary | ICD-10-CM

## 2023-02-23 PROCEDURE — 3077F SYST BP >= 140 MM HG: CPT | Performed by: FAMILY MEDICINE

## 2023-02-23 PROCEDURE — 3080F DIAST BP >= 90 MM HG: CPT | Performed by: FAMILY MEDICINE

## 2023-02-23 PROCEDURE — 99214 OFFICE O/P EST MOD 30 MIN: CPT | Performed by: FAMILY MEDICINE

## 2023-02-23 RX ORDER — ROSUVASTATIN CALCIUM 5 MG/1
5 TABLET, COATED ORAL
Qty: 90 TABLET | Refills: 0 | Status: SHIPPED | OUTPATIENT
Start: 2023-02-23

## 2023-02-23 RX ORDER — TOPIRAMATE 50 MG/1
50 TABLET, FILM COATED ORAL 2 TIMES DAILY
Qty: 60 TABLET | Refills: 0 | Status: SHIPPED | OUTPATIENT
Start: 2023-02-23

## 2023-02-23 NOTE — PROGRESS NOTES
1. Have you been to the ER, urgent care clinic since your last visit? Hospitalized since your last visit? No    2. Have you seen or consulted any other health care providers outside of the [address] [place] [address] since your last visit? Include any pap smears or colon screening.  No        Chief Complaint   Patient presents with    Follow-up     For the weight loss pills

## 2023-02-23 NOTE — PROGRESS NOTES
STOPPED 2 DAYS AGO, [id number]   Cyndy Valenzuela (: 1990) is a 29 y.o. male, established patient, here for evaluation of the following chief complaint(s):  Follow-up (For the weight loss pills )     ASSESSMENT/PLAN:  Below is the assessment and plan developed based on review of pertinent history, physical exam, labs, studies, and medications. 1. Essential hypertension  2. Obesity (BMI 30-39.9)  -     topiramate (TOPAMAX) 50 mg tablet; Take 1 Tablet by mouth two (2) times a day., Normal, Disp-60 Tablet, R-0  3. Severe obesity (HCC)  -     topiramate (TOPAMAX) 50 mg tablet; Take 1 Tablet by mouth two (2) times a day., Normal, Disp-60 Tablet, R-0  4. Weight loss counseling, encounter for  -     topiramate (TOPAMAX) 50 mg tablet; Take 1 Tablet by mouth two (2) times a day., Normal, Disp-60 Tablet, R-0  5. Mixed hyperlipidemia  -     rosuvastatin (CRESTOR) 5 mg tablet; Take 1 Tablet by mouth nightly., Normal, Disp-90 Tablet, R-0    Return in about 1 month (around 3/23/2023) for follow up, WEIGHT [facility]. SUBJECTIVE/OBJECTIVE:  HPI     1. Essential hypertension  The patient presents today for HTN follow-up. Has not been taking amlodipine since last 2 days as he packed up his luggage, moving to a new apartment. Mr. Jose Allan has been given the following recommendations today due to his elevated BP reading: rescreen BP within a minimum of 2 weeks and lifestyle modifications to include: dietary sodium restriction. Taking medications daily w/o complications. Home BP readings range from 570W systolic. SIde effects of meds: None  Patient trying to follow low salt diet.     Lab Results   Component Value Date/Time    Sodium 138 2022 09:43 AM    Potassium 4.5 2022 09:43 AM    Chloride 104 2022 09:43 AM    CO2 30 2022 09:43 AM    Anion gap 4 (L) 2022 09:43 AM    Glucose 84 2022 09:43 AM    BUN 10 2022 09:43 AM    Creatinine 1.01 2022 09:43 AM    BUN/Creatinine ratio 10 (L) 11/21/2022 09:43 AM    Calcium 9.7 11/21/2022 09:43 AM     Lab Results   Component Value Date/Time    Microalbumin/Creat ratio (mg/g creat) 20 11/21/2022 09:43 AM    Microalbumin,urine random 5.04 11/21/2022 09:43 AM        2. Obesity (BMI 30-39.9)  3. Severe obesity ([address]  4. Weight loss counseling, encounter for  I have reviewed/discussed the above normal BMI with the patient. I have recommended the following interventions: dietary management education, guidance, and counseling, encourage exercise, and monitor weight . Aislinn Baez Patient is currently taking Topamax 25 mg twice a day. He recently ran out of his prescription. He reports he lost 15 pounds but regained 5 pounds back after stopping Topamax. He did not report any side effects from current dose. Reports Topamax controls his binge eating. We discussed increasing the dose of Topamax. Patient has shown improvement in weight loss with Topamax without side effects. 5. Mixed hyperlipidemia  Start Crestor  Patient presents today for hyperlipidemia. Patient currently currently not taking any cholesterol medication. Advise low carbohydrate low-fat diet and 30 minutes exercise daily to lose weight. Lab Results   Component Value Date/Time    Cholesterol, total 227 (H) 11/21/2022 09:43 AM    HDL Cholesterol 47 11/21/2022 09:43 AM    LDL, calculated 164 (H) 11/21/2022 09:43 AM    VLDL, calculated 16 11/21/2022 09:43 AM    Triglyceride 80 11/21/2022 09:43 AM    CHOL/HDL Ratio 4.8 11/21/2022 09:43 AM      No results found for any visits on 02/23/23. Review of Systems   Constitutional: Negative. HENT: Negative. Eyes: Negative. Respiratory: Negative. Cardiovascular: Negative. Gastrointestinal: Negative. Genitourinary: Negative. Musculoskeletal: Negative. Skin: Negative. Neurological: Negative. Endo/Heme/Allergies: Negative. Psychiatric/Behavioral: Negative. Physical Exam  Vitals and nursing note reviewed. HENT:      Head: Normocephalic and atraumatic. Right Ear: External ear normal.      Left Ear: External ear normal.      Nose: Nose normal.   Eyes:      Conjunctiva/sclera: Conjunctivae normal.   Cardiovascular:      Rate and Rhythm: Normal rate and regular rhythm. Pulmonary:      Effort: Pulmonary effort is normal.      Breath sounds: Normal breath sounds. Abdominal:      General: Bowel sounds are normal. There is no distension. Palpations: Abdomen is soft. Tenderness: There is no abdominal tenderness. Musculoskeletal:         General: Normal range of motion. Cervical back: Normal range of motion and neck supple. Skin:     General: Skin is warm and dry. Neurological:      General: No focal deficit present. Mental Status: He is alert. BP (!) 149/97 (BP 1 Location: Right upper arm, BP Patient Position: Sitting, BP Cuff Size: Adult)   Pulse 91   Temp 97.9 °F (36.6 °C) (Temporal)   Resp 16   Ht 5' 10\" (1.778 m)   Wt 260 lb 6.4 oz (118.1 kg)   SpO2 98%   BMI 37.36 kg/m²     No Known Allergies    Current Outpatient Medications   Medication Sig    topiramate (TOPAMAX) 50 mg tablet Take 1 Tablet by mouth two (2) times a day. rosuvastatin (CRESTOR) 5 mg tablet Take 1 Tablet by mouth nightly. amLODIPine (NORVASC) 5 mg tablet Take 1 Tablet by mouth daily. No current facility-administered medications for this visit. History reviewed. No pertinent past medical history. History reviewed. No pertinent surgical history. Social History:  reports that he has quit smoking. He has never used smokeless tobacco. He reports that he does not currently use alcohol. He reports that he does not use drugs. Patient Care Team:  Laly Navarro MD as PCP - General (Family Medicine)  Laly Navarro MD as PCP - [facility] Provider    Problem List  Date Reviewed: 2/23/2023            Codes Class Noted    Obesity (BMI 30-39. 9) ICD-10-CM: E66.9  ICD-9-CM: 278.00  2/23/2023 Mixed hyperlipidemia ICD-10-CM: E78.2  ICD-9-CM: 272.2  2/23/2023        Hypercholesterolemia ICD-10-CM: E78.00  ICD-9-CM: 272.0  11/23/2022        Prediabetes ICD-10-CM: R73.03  ICD-9-CM: 790.29  11/23/2022        Essential hypertension ICD-10-CM: I10  ICD-9-CM: 401.9  11/23/2022        Severe obesity ([facility] [address] ICD-10-CM: E66.01  ICD-9-CM: 278.01  10/13/2020                I ADVISED PATIENT TO GO TO ER IF SYMPTOMS WORSEN , CHANGE OR FAILS TO IMPROVE. I have discussed the diagnosis with the patient and the intended plan as seen in the above orders. The patient has received an after-visit summary and questions were answered concerning future plans. I have discussed medication side effects and warnings with the patient as well. The patient agrees and understands above plan. An electronic signature was used to authenticate this note.   -- Eula Chanel MD

## 2023-03-16 ENCOUNTER — OFFICE VISIT (OUTPATIENT)
Dept: FAMILY MEDICINE CLINIC | Age: 33
End: 2023-03-16
Payer: COMMERCIAL

## 2023-03-16 VITALS
BODY MASS INDEX: 35.76 KG/M2 | HEART RATE: 102 BPM | OXYGEN SATURATION: 100 % | HEIGHT: 71 IN | TEMPERATURE: 98 F | WEIGHT: 255.4 LBS | DIASTOLIC BLOOD PRESSURE: 78 MMHG | RESPIRATION RATE: 16 BRPM | SYSTOLIC BLOOD PRESSURE: 144 MMHG

## 2023-03-16 DIAGNOSIS — Z71.3 WEIGHT LOSS COUNSELING, ENCOUNTER FOR: ICD-10-CM

## 2023-03-16 DIAGNOSIS — E66.9 OBESITY (BMI 30-39.9): ICD-10-CM

## 2023-03-16 DIAGNOSIS — I10 ESSENTIAL HYPERTENSION: ICD-10-CM

## 2023-03-16 DIAGNOSIS — E66.01 SEVERE OBESITY (HCC): ICD-10-CM

## 2023-03-16 PROCEDURE — 99214 OFFICE O/P EST MOD 30 MIN: CPT | Performed by: FAMILY MEDICINE

## 2023-03-16 PROCEDURE — 3077F SYST BP >= 140 MM HG: CPT | Performed by: FAMILY MEDICINE

## 2023-03-16 PROCEDURE — 3078F DIAST BP <80 MM HG: CPT | Performed by: FAMILY MEDICINE

## 2023-03-16 RX ORDER — AMLODIPINE BESYLATE 10 MG/1
10 TABLET ORAL DAILY
Qty: 90 TABLET | Refills: 0 | Status: SHIPPED | OUTPATIENT
Start: 2023-03-16

## 2023-03-16 RX ORDER — TOPIRAMATE 50 MG/1
150 TABLET, FILM COATED ORAL 3 TIMES DAILY
Qty: 90 TABLET | Refills: 0 | Status: SHIPPED | OUTPATIENT
Start: 2023-03-16

## 2023-03-16 NOTE — PROGRESS NOTES
1. Have you been to the ER, urgent care clinic since your last visit? Hospitalized since your last visit? No    2. Have you seen or consulted any other health care providers outside of the [address] [place] [address] since your last visit? Include any pap smears or colon screening.  No          Chief Complaint   Patient presents with    Follow-up     Weight management     Blood Pressure Check

## 2023-03-16 NOTE — PROGRESS NOTES
3/16/2023   Jeffery Hatch (: 1990) is a 29 y.o. male, established patient, here for evaluation of the following chief complaint(s):  Follow-up (Weight management ) and Blood Pressure Check     ASSESSMENT/PLAN:  Below is the assessment and plan developed based on review of pertinent history, physical exam, labs, studies, and medications. 1. Obesity (BMI 30-39.9)  -     topiramate (TOPAMAX) 50 mg tablet; Take 3 Tablets by mouth three (3) times daily. , Normal, Disp-90 Tablet, R-0  2. Severe obesity (HCC)  -     topiramate (TOPAMAX) 50 mg tablet; Take 3 Tablets by mouth three (3) times daily. , Normal, Disp-90 Tablet, R-0  3. Weight loss counseling, encounter for  -     topiramate (TOPAMAX) 50 mg tablet; Take 3 Tablets by mouth three (3) times daily. , Normal, Disp-90 Tablet, R-0  4. Essential hypertension  -     amLODIPine (NORVASC) 10 mg tablet; Take 1 Tablet by mouth daily. , Normal, Disp-90 Tablet, R-0    Return in about 1 month (around 2023) for BP, follow up, WEIGHT CHECK. SUBJECTIVE/OBJECTIVE:  HPI     1. Obesity (BMI 30-39.9)  2. Severe obesity ([address]  3. Weight loss counseling, encounter for  I have reviewed/discussed the above normal BMI with the patient. I have recommended the following interventions: dietary management education, guidance, and counseling, encourage exercise, and monitor weight . Yue Thakur Patient lost 5 pounds. Currently taking Topamax 50 mg twice a day without side effects. Agrees to increase dose. I will increase to 53 times a day. Recheck in 1 month  4. Essential hypertension  Increase dose of amlodipine to 10 mg daily. The patient presents today for HTN follow-up. Taking medications daily w/o complications. Home BP readings range from did not bring log. SIde effects of meds: None  Patient trying to follow low salt diet.     Lab Results   Component Value Date/Time    Sodium 138 2022 09:43 AM    Potassium 4.5 2022 09:43 AM    Chloride 104 2022 09:43 AM    CO2 30 11/21/2022 09:43 AM    Anion gap 4 (L) 11/21/2022 09:43 AM    Glucose 84 11/21/2022 09:43 AM    BUN 10 11/21/2022 09:43 AM    Creatinine 1.01 11/21/2022 09:43 AM    BUN/Creatinine ratio 10 (L) 11/21/2022 09:43 AM    Calcium 9.7 11/21/2022 09:43 AM     Lab Results   Component Value Date/Time    Microalbumin/Creat ratio (mg/g creat) 20 11/21/2022 09:43 AM    Microalbumin,urine random 5.04 11/21/2022 09:43 AM           No results found for any visits on 03/16/23. Review of Systems   Constitutional: Negative. HENT: Negative. Eyes: Negative. Respiratory: Negative. Cardiovascular: Negative. Gastrointestinal: Negative. Genitourinary: Negative. Musculoskeletal: Negative. Skin: Negative. Neurological: Negative. Endo/Heme/Allergies: Negative. Psychiatric/Behavioral: Negative. Physical Exam  Vitals and nursing note reviewed. HENT:      Head: Normocephalic and atraumatic. Right Ear: External ear normal.      Left Ear: External ear normal.      Nose: Nose normal.   Eyes:      Conjunctiva/sclera: Conjunctivae normal.   Cardiovascular:      Rate and Rhythm: Normal rate and regular rhythm. Pulmonary:      Effort: Pulmonary effort is normal.      Breath sounds: Normal breath sounds. Abdominal:      General: Bowel sounds are normal. There is no distension. Palpations: Abdomen is soft. Tenderness: There is no abdominal tenderness. Musculoskeletal:         General: Normal range of motion. Cervical back: Normal range of motion and neck supple. Skin:     General: Skin is warm and dry. Neurological:      General: No focal deficit present. Mental Status: He is alert.      BP (!) 144/78 (BP 1 Location: Right upper arm, BP Patient Position: Sitting, BP Cuff Size: Adult)   Pulse (!) 102   Temp 98 °F (36.7 °C) (Temporal)   Resp 16   Ht 5' 11\" (1.803 m)   Wt 255 lb 6.4 oz (115.8 kg)   SpO2 100%   BMI 35.62 kg/m²     No Known Allergies    Current Outpatient Medications   Medication Sig    topiramate (TOPAMAX) 50 mg tablet Take 3 Tablets by mouth three (3) times daily. amLODIPine (NORVASC) 10 mg tablet Take 1 Tablet by mouth daily. rosuvastatin (CRESTOR) 5 mg tablet Take 1 Tablet by mouth nightly. No current facility-administered medications for this visit. History reviewed. No pertinent past medical history. History reviewed. No pertinent surgical history. Social History:  reports that he has never smoked. He has never used smokeless tobacco. He reports current alcohol use. He reports that he does not use drugs. Patient Care Team:  Kaila Oquendo MD as PCP - General (Family Medicine)  Kaila Oquendo MD as PCP - [facility]    Problem List  Date Reviewed: 3/16/2023            Codes Class Noted    Obesity (BMI 30-39. 9) ICD-10-CM: E66.9  ICD-9-CM: 278.00  2/23/2023        Mixed hyperlipidemia ICD-10-CM: E78.2  ICD-9-CM: 272.2  2/23/2023        Hypercholesterolemia ICD-10-CM: E78.00  ICD-9-CM: 272.0  11/23/2022        Prediabetes ICD-10-CM: R73.03  ICD-9-CM: 790.29  11/23/2022        Essential hypertension ICD-10-CM: I10  ICD-9-CM: 401.9  11/23/2022        Severe obesity ([address] ICD-10-CM: E66.01  ICD-9-CM: 278.01  10/13/2020                I ADVISED PATIENT TO GO TO ER IF SYMPTOMS WORSEN , CHANGE OR FAILS TO IMPROVE. I have discussed the diagnosis with the patient and the intended plan as seen in the above orders. The patient has received an after-visit summary and questions were answered concerning future plans. I have discussed medication side effects and warnings with the patient as well. The patient agrees and understands above plan. An electronic signature was used to authenticate this note.   -- John Pike MD

## 2023-05-19 RX ORDER — ROSUVASTATIN CALCIUM 5 MG/1
1 TABLET, COATED ORAL NIGHTLY
COMMUNITY
Start: 2023-02-23

## 2023-05-19 RX ORDER — AMLODIPINE BESYLATE 10 MG/1
10 TABLET ORAL DAILY
COMMUNITY
Start: 2023-03-16

## 2023-05-19 RX ORDER — TOPIRAMATE 50 MG/1
150 TABLET, FILM COATED ORAL 3 TIMES DAILY
COMMUNITY
Start: 2023-03-16

## 2023-09-16 ENCOUNTER — HOSPITAL ENCOUNTER (EMERGENCY)
Facility: HOSPITAL | Age: 33
Discharge: HOME | End: 2023-09-16
Attending: STUDENT IN AN ORGANIZED HEALTH CARE EDUCATION/TRAINING PROGRAM
Payer: COMMERCIAL

## 2023-09-16 ENCOUNTER — APPOINTMENT (OUTPATIENT)
Facility: HOSPITAL | Age: 33
End: 2023-09-16
Payer: COMMERCIAL

## 2023-09-16 VITALS
DIASTOLIC BLOOD PRESSURE: 93 MMHG | OXYGEN SATURATION: 98 % | HEIGHT: 70 IN | WEIGHT: 260 LBS | SYSTOLIC BLOOD PRESSURE: 167 MMHG | BODY MASS INDEX: 37.22 KG/M2 | RESPIRATION RATE: 16 BRPM | HEART RATE: 100 BPM | TEMPERATURE: 98.6 F

## 2023-09-16 DIAGNOSIS — M54.16 LUMBAR RADICULOPATHY: Primary | ICD-10-CM

## 2023-09-16 PROCEDURE — 6370000000 HC RX 637 (ALT 250 FOR IP): Performed by: STUDENT IN AN ORGANIZED HEALTH CARE EDUCATION/TRAINING PROGRAM

## 2023-09-16 PROCEDURE — 99284 EMERGENCY DEPT VISIT MOD MDM: CPT

## 2023-09-16 PROCEDURE — 96372 THER/PROPH/DIAG INJ SC/IM: CPT

## 2023-09-16 PROCEDURE — 6360000002 HC RX W HCPCS: Performed by: STUDENT IN AN ORGANIZED HEALTH CARE EDUCATION/TRAINING PROGRAM

## 2023-09-16 PROCEDURE — 72100 X-RAY EXAM L-S SPINE 2/3 VWS: CPT

## 2023-09-16 RX ORDER — NAPROXEN 500 MG/1
500 TABLET ORAL 2 TIMES DAILY
Qty: 10 TABLET | Refills: 0 | Status: SHIPPED | OUTPATIENT
Start: 2023-09-16 | End: 2023-09-21

## 2023-09-16 RX ORDER — METHOCARBAMOL 750 MG/1
750 TABLET, FILM COATED ORAL 4 TIMES DAILY
Qty: 20 TABLET | Refills: 0 | Status: SHIPPED | OUTPATIENT
Start: 2023-09-16 | End: 2023-09-21

## 2023-09-16 RX ORDER — PREDNISONE 20 MG/1
60 TABLET ORAL
Status: COMPLETED | OUTPATIENT
Start: 2023-09-16 | End: 2023-09-16

## 2023-09-16 RX ORDER — METHOCARBAMOL 750 MG/1
750 TABLET, FILM COATED ORAL ONCE
Status: COMPLETED | OUTPATIENT
Start: 2023-09-16 | End: 2023-09-16

## 2023-09-16 RX ORDER — LIDOCAINE 4 G/G
1 PATCH TOPICAL
Status: DISCONTINUED | OUTPATIENT
Start: 2023-09-16 | End: 2023-09-16 | Stop reason: HOSPADM

## 2023-09-16 RX ORDER — LIDOCAINE 50 MG/G
1 PATCH TOPICAL DAILY
Qty: 10 PATCH | Refills: 0 | Status: SHIPPED | OUTPATIENT
Start: 2023-09-16 | End: 2023-09-26

## 2023-09-16 RX ORDER — PREDNISONE 5 MG/1
TABLET ORAL
Qty: 21 EACH | Refills: 0 | Status: SHIPPED | OUTPATIENT
Start: 2023-09-16

## 2023-09-16 RX ORDER — KETOROLAC TROMETHAMINE 30 MG/ML
30 INJECTION, SOLUTION INTRAMUSCULAR; INTRAVENOUS
Status: COMPLETED | OUTPATIENT
Start: 2023-09-16 | End: 2023-09-16

## 2023-09-16 RX ADMIN — METHOCARBAMOL 750 MG: 750 TABLET ORAL at 14:04

## 2023-09-16 RX ADMIN — KETOROLAC TROMETHAMINE 30 MG: 30 INJECTION, SOLUTION INTRAMUSCULAR; INTRAVENOUS at 14:05

## 2023-09-16 RX ADMIN — PREDNISONE 60 MG: 20 TABLET ORAL at 14:04

## 2023-09-16 ASSESSMENT — PAIN DESCRIPTION - LOCATION: LOCATION: BACK

## 2023-09-16 ASSESSMENT — ENCOUNTER SYMPTOMS
BACK PAIN: 1
CONSTIPATION: 0

## 2023-09-16 ASSESSMENT — PAIN - FUNCTIONAL ASSESSMENT: PAIN_FUNCTIONAL_ASSESSMENT: 0-10

## 2023-09-16 ASSESSMENT — PAIN DESCRIPTION - ORIENTATION: ORIENTATION: LOWER

## 2023-09-16 ASSESSMENT — PAIN SCALES - GENERAL: PAINLEVEL_OUTOF10: 7

## 2023-09-16 ASSESSMENT — PAIN DESCRIPTION - DESCRIPTORS: DESCRIPTORS: SHOOTING;SHARP

## 2023-09-16 NOTE — ED PROVIDER NOTES
[facility] EMERGENCY DEPT  EMERGENCY DEPARTMENT ENCOUNTER      Pt Name: Yusef Barrow  MRN: [id number]  [address] [facility] 1990  Date of evaluation: 9/16/2023  Provider: Silvestre Ag, [address] [facility]       Chief Complaint   Patient presents with    Back Pain         HISTORY OF PRESENT ILLNESS   (Location/Symptom, Timing/Onset, Context/Setting, Quality, Duration, Modifying Factors, Severity)  Note limiting factors. 53-year-old male who is otherwise healthy presenting today with back pain. Symptoms started yesterday when he was washing his car. He does not remember specifically injuring the back but thinks he did it while injuring his car. The pain is in the low back and radiates down the right leg. No numbness or weakness. No saddle anesthesia. No bowel or bladder incontinence. No fevers. No history of back surgery. No history of diabetes, cancers or IVDA. Worse with movement. Review of External Medical Records:     Nursing Notes were reviewed. REVIEW OF SYSTEMS    (2-9 systems for level 4, 10 or more for level 5)     Review of Systems   Gastrointestinal:  Negative for constipation. Genitourinary:  Negative for difficulty urinating. Musculoskeletal:  Positive for arthralgias and back pain. Neurological:  Negative for weakness and numbness. Except as noted above the remainder of the review of systems was reviewed and negative. PAST MEDICAL HISTORY   No past medical history on file. SURGICAL HISTORY     No past surgical history on file. CURRENT MEDICATIONS       Previous Medications    AMLODIPINE (NORVASC) 10 MG TABLET    Take 1 tablet by mouth daily    ROSUVASTATIN (CRESTOR) 5 MG TABLET    Take 1 tablet by mouth nightly    TOPIRAMATE (TOPAMAX) 50 MG TABLET    Take 3 tablets by mouth 3 times daily       ALLERGIES     Patient has no known allergies.     FAMILY HISTORY       Family History   Problem Relation Age of Onset    Diabetes Father     Hypertension

## 2023-09-16 NOTE — ED NOTES
Discharge instructions reviewed with patient who verbalized understanding.       Mike Landon RN  09/16/23 [id number]

## 2023-09-16 NOTE — ED TRIAGE NOTES
Patient arrives to ED with a CC of back pain since yesterday around 0800. Patient believes he may have pulled a muscle while washing his car. Reports it feels like when he hurt his back a couple of years ago when he bent down to lift a box. Patient states the pain shoots down his right leg.

## 2023-10-12 ENCOUNTER — TELEMEDICINE (OUTPATIENT)
Facility: CLINIC | Age: 33
End: 2023-10-12
Payer: COMMERCIAL

## 2023-10-12 DIAGNOSIS — I10 ESSENTIAL HYPERTENSION: ICD-10-CM

## 2023-10-12 DIAGNOSIS — E78.2 MIXED HYPERLIPIDEMIA: ICD-10-CM

## 2023-10-12 DIAGNOSIS — R73.03 PREDIABETES: ICD-10-CM

## 2023-10-12 DIAGNOSIS — E66.9 OBESITY (BMI 30-39.9): Primary | ICD-10-CM

## 2023-10-12 PROCEDURE — 99214 OFFICE O/P EST MOD 30 MIN: CPT | Performed by: STUDENT IN AN ORGANIZED HEALTH CARE EDUCATION/TRAINING PROGRAM

## 2023-10-12 RX ORDER — SEMAGLUTIDE 0.25 MG/.5ML
0.25 INJECTION, SOLUTION SUBCUTANEOUS
Qty: 2 ML | Refills: 0 | Status: SHIPPED | OUTPATIENT
Start: 2023-10-12

## 2023-10-12 RX ORDER — ROSUVASTATIN CALCIUM 5 MG/1
5 TABLET, COATED ORAL NIGHTLY
Qty: 90 TABLET | Refills: 0 | Status: SHIPPED | OUTPATIENT
Start: 2023-10-12

## 2023-10-12 RX ORDER — AMLODIPINE BESYLATE 10 MG/1
10 TABLET ORAL DAILY
Qty: 90 TABLET | Refills: 0 | Status: SHIPPED | OUTPATIENT
Start: 2023-10-12

## 2023-10-12 ASSESSMENT — ENCOUNTER SYMPTOMS
COUGH: 0
SHORTNESS OF BREATH: 0
RHINORRHEA: 0
NAUSEA: 0
VOMITING: 0
ABDOMINAL PAIN: 0

## 2023-10-12 NOTE — PROGRESS NOTES
Identified pt with two pt identifiers(name and ). Reviewed record in preparation for visit and have obtained necessary documentation. Chief Complaint   Patient presents with    Weight Loss     Discuss medication option         Health Maintenance Due   Topic    Hepatitis B vaccine (1 of 3 - 3-dose series)    COVID-19 Vaccine (1)    Varicella vaccine (1 of 2 - 2-dose childhood series)    HIV screen     Hepatitis C screen     DTaP/Tdap/Td vaccine (1 - Tdap)    Flu vaccine (1)       Coordination of Care Questionnaire:  :   1) Have you been to an emergency room, urgent care, or hospitalized since your last visit? If yes, where when, and reason for visit? Yes, [facility] for low back pain       2. Have seen or consulted any other health care provider since your last visit? If yes, where when, and reason for visit?  no        Patient is accompanied by self I have received verbal consent from Marichuy Huggins to discuss any/all medical information while they are present in the room.

## 2023-10-12 NOTE — PROGRESS NOTES
Sindhu Miller, was evaluated through a synchronous (real-time) audio-video encounter. The patient (or guardian if applicable) is aware that this is a billable service, which includes applicable co-pays. This Virtual Visit was conducted with patient's (and/or legal guardian's) consent. Patient identification was verified, and a caregiver was present when appropriate. The patient was located at Other: Work  Provider was located at Other: [employer] (:  1990) is a Established patient, presenting virtually for evaluation of the following:    Assessment & Plan   Below is the assessment and plan developed based on review of pertinent history, physical exam, labs, studies, and medications. 1. Obesity (BMI 30-39.9)  -     Semaglutide-Weight Management (WEGOVY) 0.25 MG/0.5ML SOAJ SC injection; Inject 0.25 mg into the skin every 7 days, Disp-2 mL, R-0Normal  -Chronic. BMI of 37.  -Reviewed dietary and lifestyle modifications to assist in further weight loss  -Patient had side effects from topiramate and has been off of this medication  -Patient would like additional medication management to assist in weight loss. -Therefore will start [facility] once weekly. Reviewed medication risks and side effects.  -Follow-up in 3 months for reevaluation or sooner if needed    2. Essential hypertension  -     amLODIPine (NORVASC) 10 MG tablet; Take 1 tablet by mouth daily, Disp-90 tablet, R-0Normal  -     TSH; Future  -     Microalbumin / Creatinine Urine Ratio; Future  -     CBC; Future  -     Comprehensive Metabolic Panel; Future  -Chronic. Uncertain of control. Recent BP was elevated due to acute back pain.  -Therefore advised patient to monitor home BP readings. If BP is greater than 440 systolic or greater than 90 diastolic follow-up for further evaluation  -For now continue on amlodipine 10 mg daily  -Check routine lab work today    3. Mixed hyperlipidemia  -     rosuvastatin (CRESTOR) 5 MG tablet;  Take 1

## 2023-10-16 ENCOUNTER — TELEPHONE (OUTPATIENT)
Facility: CLINIC | Age: 33
End: 2023-10-16

## 2024-01-18 ENCOUNTER — HOSPITAL ENCOUNTER (EMERGENCY)
Facility: HOSPITAL | Age: 34
Discharge: HOME | End: 2024-01-18
Attending: EMERGENCY MEDICINE
Payer: COMMERCIAL

## 2024-01-18 VITALS
HEIGHT: 71 IN | DIASTOLIC BLOOD PRESSURE: 95 MMHG | BODY MASS INDEX: 36.12 KG/M2 | OXYGEN SATURATION: 99 % | WEIGHT: 258 LBS | TEMPERATURE: 98.2 F | SYSTOLIC BLOOD PRESSURE: 156 MMHG | RESPIRATION RATE: 16 BRPM | HEART RATE: 100 BPM

## 2024-01-18 DIAGNOSIS — H10.31 ACUTE BACTERIAL CONJUNCTIVITIS OF RIGHT EYE: Primary | ICD-10-CM

## 2024-01-18 PROCEDURE — 99283 EMERGENCY DEPT VISIT LOW MDM: CPT

## 2024-01-18 RX ORDER — ERYTHROMYCIN 5 MG/G
OINTMENT OPHTHALMIC
Qty: 3.5 G | Refills: 0 | Status: SHIPPED | OUTPATIENT
Start: 2024-01-18 | End: 2024-01-28

## 2024-01-18 RX ORDER — ERYTHROMYCIN 5 MG/G
OINTMENT OPHTHALMIC
Qty: 3.5 G | Refills: 0 | Status: SHIPPED | OUTPATIENT
Start: 2024-01-18 | End: 2024-01-18

## 2024-01-18 ASSESSMENT — PAIN SCALES - GENERAL: PAINLEVEL_OUTOF10: 3

## 2024-01-18 ASSESSMENT — PAIN DESCRIPTION - ORIENTATION: ORIENTATION: RIGHT

## 2024-01-18 ASSESSMENT — ENCOUNTER SYMPTOMS
VOICE CHANGE: 0
SORE THROAT: 0
EYE ITCHING: 0
EYE DISCHARGE: 1
EYE REDNESS: 1
EYE PAIN: 1
PHOTOPHOBIA: 0
FACIAL SWELLING: 0

## 2024-01-18 ASSESSMENT — PAIN - FUNCTIONAL ASSESSMENT: PAIN_FUNCTIONAL_ASSESSMENT: 0-10

## 2024-01-18 ASSESSMENT — PAIN DESCRIPTION - DESCRIPTORS: DESCRIPTORS: DISCOMFORT

## 2024-01-18 ASSESSMENT — PAIN DESCRIPTION - LOCATION: LOCATION: EYE

## 2024-01-19 NOTE — ED TRIAGE NOTES
Patient reports right eye redness and pain since 2pm today with yellow drainage. Denies any fevers.

## 2024-01-19 NOTE — ED PROVIDER NOTES
[facility] EMERGENCY DEPT  EMERGENCY DEPARTMENT ENCOUNTER      Pt Name: Roverto Hsu  MRN: [id number]  Birthdate 1990  Date of evaluation: 1/18/2024  Provider: CASSY Brown    CHIEF COMPLAINT       Chief Complaint   Patient presents with    Eye Pain         HISTORY OF PRESENT ILLNESS    Patient is a 33-year-old male who presents to ED complaining of right eye redness which started at 2 PM today.  Patient reports eye pain, redness and discharge.  He denies any visual changes any fever, facial swelling or similar symptoms previously.  He denies contact lens use.  He is concerned he has pinkeye.            Review of External Medical Records:     Nursing Notes were reviewed.    REVIEW OF SYSTEMS       Review of Systems   Constitutional:  Negative for chills and fever.   HENT:  Negative for facial swelling, sore throat and voice change.    Eyes:  Positive for pain, discharge and redness. Negative for photophobia, itching and visual disturbance.       Except as noted above the remainder of the review of systems was reviewed and negative.       PAST MEDICAL HISTORY   No past medical history on file.      SURGICAL HISTORY     No past surgical history on file.      CURRENT MEDICATIONS       Previous Medications    AMLODIPINE (NORVASC) 10 MG TABLET    Take 1 tablet by mouth daily    ROSUVASTATIN (CRESTOR) 5 MG TABLET    Take 1 tablet by mouth nightly    SEMAGLUTIDE-WEIGHT MANAGEMENT (WEGOVY) 0.25 MG/0.5ML SOAJ SC INJECTION    Inject 0.25 mg into the skin every 7 days       ALLERGIES     Patient has no known allergies.    FAMILY HISTORY       Family History   Problem Relation Age of Onset    Diabetes Father     Hypertension Mother           SOCIAL HISTORY       Social History     Socioeconomic History    Marital status: Single   Tobacco Use    Smoking status: Never    Smokeless tobacco: Never   Substance and Sexual Activity    Alcohol use: Yes    Drug use: No           PHYSICAL EXAM         ED TRIAGE VITALS  BP:

## 2024-01-19 NOTE — DISCHARGE INSTRUCTIONS
Use erythromycin ointment as prescribed.  Recommend follow-up with your primary care physician or [address] if needed.  If you develop new or worsening symptoms return to the ER.

## 2024-01-19 NOTE — ED NOTES
Pt ambulatory at discharge with discharge instructions reviewed and opportunity to answer questions given. Prescription sent to preferred pharmacy.

## 2024-10-26 SDOH — ECONOMIC STABILITY: INCOME INSECURITY: HOW HARD IS IT FOR YOU TO PAY FOR THE VERY BASICS LIKE FOOD, HOUSING, MEDICAL CARE, AND HEATING?: PATIENT DECLINED

## 2024-10-26 SDOH — ECONOMIC STABILITY: TRANSPORTATION INSECURITY
IN THE PAST 12 MONTHS, HAS LACK OF TRANSPORTATION KEPT YOU FROM MEETINGS, WORK, OR FROM GETTING THINGS NEEDED FOR DAILY LIVING?: NO

## 2024-10-26 SDOH — ECONOMIC STABILITY: FOOD INSECURITY: WITHIN THE PAST 12 MONTHS, YOU WORRIED THAT YOUR FOOD WOULD RUN OUT BEFORE YOU GOT MONEY TO BUY MORE.: PATIENT DECLINED

## 2024-10-26 SDOH — ECONOMIC STABILITY: FOOD INSECURITY: WITHIN THE PAST 12 MONTHS, THE FOOD YOU BOUGHT JUST DIDN'T LAST AND YOU DIDN'T HAVE MONEY TO GET MORE.: PATIENT DECLINED

## 2024-10-28 ENCOUNTER — OFFICE VISIT (OUTPATIENT)
Facility: CLINIC | Age: 34
End: 2024-10-28
Payer: COMMERCIAL

## 2024-10-28 VITALS
TEMPERATURE: 98.8 F | WEIGHT: 284.8 LBS | DIASTOLIC BLOOD PRESSURE: 102 MMHG | OXYGEN SATURATION: 96 % | BODY MASS INDEX: 40.77 KG/M2 | HEIGHT: 70 IN | HEART RATE: 92 BPM | SYSTOLIC BLOOD PRESSURE: 165 MMHG | RESPIRATION RATE: 18 BRPM

## 2024-10-28 DIAGNOSIS — E66.813 CLASS 3 SEVERE OBESITY DUE TO EXCESS CALORIES WITH BODY MASS INDEX (BMI) OF 40.0 TO 44.9 IN ADULT, UNSPECIFIED WHETHER SERIOUS COMORBIDITY PRESENT: ICD-10-CM

## 2024-10-28 DIAGNOSIS — I10 ESSENTIAL HYPERTENSION: Primary | ICD-10-CM

## 2024-10-28 DIAGNOSIS — Z11.59 ENCOUNTER FOR HEPATITIS C SCREENING TEST FOR LOW RISK PATIENT: ICD-10-CM

## 2024-10-28 DIAGNOSIS — R73.03 PREDIABETES: ICD-10-CM

## 2024-10-28 DIAGNOSIS — E78.2 MIXED HYPERLIPIDEMIA: ICD-10-CM

## 2024-10-28 DIAGNOSIS — E66.01 CLASS 3 SEVERE OBESITY DUE TO EXCESS CALORIES WITH BODY MASS INDEX (BMI) OF 40.0 TO 44.9 IN ADULT, UNSPECIFIED WHETHER SERIOUS COMORBIDITY PRESENT: ICD-10-CM

## 2024-10-28 DIAGNOSIS — Z11.4 ENCOUNTER FOR SCREENING FOR HIV: ICD-10-CM

## 2024-10-28 PROCEDURE — 99214 OFFICE O/P EST MOD 30 MIN: CPT | Performed by: STUDENT IN AN ORGANIZED HEALTH CARE EDUCATION/TRAINING PROGRAM

## 2024-10-28 PROCEDURE — 3077F SYST BP >= 140 MM HG: CPT | Performed by: STUDENT IN AN ORGANIZED HEALTH CARE EDUCATION/TRAINING PROGRAM

## 2024-10-28 PROCEDURE — 3080F DIAST BP >= 90 MM HG: CPT | Performed by: STUDENT IN AN ORGANIZED HEALTH CARE EDUCATION/TRAINING PROGRAM

## 2024-10-28 RX ORDER — HYDROCHLOROTHIAZIDE 12.5 MG/1
12.5 CAPSULE ORAL EVERY MORNING
Qty: 30 CAPSULE | Refills: 1 | Status: SHIPPED | OUTPATIENT
Start: 2024-10-28

## 2024-10-28 ASSESSMENT — ENCOUNTER SYMPTOMS
NAUSEA: 0
SHORTNESS OF BREATH: 0
ABDOMINAL PAIN: 0
VOMITING: 0
COUGH: 0
RHINORRHEA: 0

## 2024-10-28 ASSESSMENT — PATIENT HEALTH QUESTIONNAIRE - PHQ9
SUM OF ALL RESPONSES TO PHQ QUESTIONS 1-9: 0
SUM OF ALL RESPONSES TO PHQ QUESTIONS 1-9: 0
SUM OF ALL RESPONSES TO PHQ9 QUESTIONS 1 & 2: 0
1. LITTLE INTEREST OR PLEASURE IN DOING THINGS: NOT AT ALL
2. FEELING DOWN, DEPRESSED OR HOPELESS: NOT AT ALL
SUM OF ALL RESPONSES TO PHQ QUESTIONS 1-9: 0
SUM OF ALL RESPONSES TO PHQ QUESTIONS 1-9: 0

## 2024-10-28 NOTE — PROGRESS NOTES
Identified pt with two pt identifiers(name and ).      Coordination of Care Questionnaire:  :   1. \"Have you been to the ER, urgent care clinic since your last visit?  No  Hospitalized since your last visit?\" No     2. \"Have you seen or consulted any other health care providers outside of the [facility] since your last visit?\"  No     I have received verbal consent from  patient  to discuss any/all medical information while they are present in the room.    Reviewed record in preparation for visit and have obtained necessary documentation.  Medication reconciliation up to date and corrected with patient at this time.        
the Last Year: Not on file     Homeless in the Last Year: No        Patient is a 33-year-old male who presents to the office today for routine follow-up.  He does have a history of hypertension.  He had previously been on amlodipine but states that it made him feel sick.  Therefore he stopped taking the medication about 6 to 7 months ago.  Since then he has not remained on any blood pressure medication.  He states recently his girlfriend checked his BP and it was elevated.  He is currently completely asymptomatic.  He also stopped his Crestor because he ran out of the medication.  He also states that he is having difficulty maintaining his weight.  He is interested in assistance with weight loss management.  He is agreeable to a referral.  He denies any other acute concerns.          ROS:   Review of Systems   Constitutional:  Negative for chills and fever.   HENT:  Negative for rhinorrhea.    Eyes:  Negative for visual disturbance.   Respiratory:  Negative for cough and shortness of breath.    Cardiovascular:  Negative for chest pain and leg swelling.   Gastrointestinal:  Negative for abdominal pain, nausea and vomiting.   Neurological:  Negative for dizziness, weakness, numbness and headaches.         Objective:     Vitals:    10/28/24 1436   BP: (!) 165/102   Pulse: 92   Resp:    Temp:    SpO2:       BP (!) 165/102 (Site: Right Upper Arm, Position: Sitting)   Pulse 92   Temp 98.8 °F (37.1 °C) (Temporal)   Resp 18   Ht 1.778 m (5' 10\")   Wt 129.2 kg (284 lb 12.8 oz)   SpO2 96%   BMI 40.86 kg/m²       Vitals and Nurse Documentation reviewed.     Physical Exam  Constitutional:       General: He is not in acute distress.     Appearance: Normal appearance. He is obese. He is not ill-appearing.   HENT:      Head: Normocephalic and atraumatic.   Eyes:      Conjunctiva/sclera: Conjunctivae normal.   Cardiovascular:      Rate and Rhythm: Normal rate and regular rhythm.      Heart sounds: Normal heart sounds. No

## 2024-10-29 LAB
ALBUMIN SERPL-MCNC: 4 G/DL (ref 3.5–5)
ALBUMIN/GLOB SERPL: 1.1 (ref 1.1–2.2)
ALP SERPL-CCNC: 71 U/L (ref 45–117)
ALT SERPL-CCNC: 38 U/L (ref 12–78)
ANION GAP SERPL CALC-SCNC: 5 MMOL/L (ref 2–12)
AST SERPL-CCNC: 21 U/L (ref 15–37)
BILIRUB SERPL-MCNC: 0.4 MG/DL (ref 0.2–1)
BUN SERPL-MCNC: 8 MG/DL (ref 6–20)
BUN/CREAT SERPL: 7 (ref 12–20)
CALCIUM SERPL-MCNC: 10.1 MG/DL (ref 8.5–10.1)
CHLORIDE SERPL-SCNC: 105 MMOL/L (ref 97–108)
CHOLEST SERPL-MCNC: 240 MG/DL
CO2 SERPL-SCNC: 29 MMOL/L (ref 21–32)
CREAT SERPL-MCNC: 1.09 MG/DL (ref 0.7–1.3)
CREAT UR-MCNC: 58.2 MG/DL
ERYTHROCYTE [DISTWIDTH] IN BLOOD BY AUTOMATED COUNT: 13.7 % (ref 11.5–14.5)
EST. AVERAGE GLUCOSE BLD GHB EST-MCNC: 126 MG/DL
GLOBULIN SER CALC-MCNC: 3.5 G/DL (ref 2–4)
GLUCOSE SERPL-MCNC: 95 MG/DL (ref 65–100)
HBA1C MFR BLD: 6 % (ref 4–5.6)
HCT VFR BLD AUTO: 45.3 % (ref 36.6–50.3)
HCV AB SER IA-ACNC: <0.02 INDEX
HCV AB SERPL QL IA: NONREACTIVE
HDLC SERPL-MCNC: 45 MG/DL
HDLC SERPL: 5.3 (ref 0–5)
HGB BLD-MCNC: 14.8 G/DL (ref 12.1–17)
HIV 1+2 AB+HIV1 P24 AG SERPL QL IA: NONREACTIVE
HIV 1/2 RESULT COMMENT: NORMAL
LDLC SERPL CALC-MCNC: 169.8 MG/DL (ref 0–100)
MCH RBC QN AUTO: 28.5 PG (ref 26–34)
MCHC RBC AUTO-ENTMCNC: 32.7 G/DL (ref 30–36.5)
MCV RBC AUTO: 87.1 FL (ref 80–99)
MICROALBUMIN UR-MCNC: 3.05 MG/DL
MICROALBUMIN/CREAT UR-RTO: 52 MG/G (ref 0–30)
NRBC # BLD: 0 K/UL (ref 0–0.01)
NRBC BLD-RTO: 0 PER 100 WBC
PLATELET # BLD AUTO: 285 K/UL (ref 150–400)
PMV BLD AUTO: 10.2 FL (ref 8.9–12.9)
POTASSIUM SERPL-SCNC: 3.7 MMOL/L (ref 3.5–5.1)
PROT SERPL-MCNC: 7.5 G/DL (ref 6.4–8.2)
RBC # BLD AUTO: 5.2 M/UL (ref 4.1–5.7)
SODIUM SERPL-SCNC: 139 MMOL/L (ref 136–145)
TRIGL SERPL-MCNC: 126 MG/DL
TSH SERPL DL<=0.05 MIU/L-ACNC: 1.85 UIU/ML (ref 0.36–3.74)
VLDLC SERPL CALC-MCNC: 25.2 MG/DL
WBC # BLD AUTO: 4.1 K/UL (ref 4.1–11.1)

## 2024-11-08 ENCOUNTER — TELEPHONE (OUTPATIENT)
Age: 34
End: 2024-11-08

## 2024-11-08 ENCOUNTER — HOSPITAL ENCOUNTER (EMERGENCY)
Facility: HOSPITAL | Age: 34
Discharge: HOME | End: 2024-11-08
Attending: EMERGENCY MEDICINE
Payer: COMMERCIAL

## 2024-11-08 ENCOUNTER — APPOINTMENT (OUTPATIENT)
Facility: HOSPITAL | Age: 34
End: 2024-11-08
Payer: COMMERCIAL

## 2024-11-08 VITALS
HEIGHT: 71 IN | SYSTOLIC BLOOD PRESSURE: 163 MMHG | TEMPERATURE: 98 F | OXYGEN SATURATION: 94 % | RESPIRATION RATE: 27 BRPM | DIASTOLIC BLOOD PRESSURE: 94 MMHG | WEIGHT: 280 LBS | BODY MASS INDEX: 39.2 KG/M2 | HEART RATE: 113 BPM

## 2024-11-08 DIAGNOSIS — I10 ESSENTIAL HYPERTENSION: ICD-10-CM

## 2024-11-08 DIAGNOSIS — R07.89 ATYPICAL CHEST PAIN: Primary | ICD-10-CM

## 2024-11-08 LAB
ALBUMIN SERPL-MCNC: 4.4 G/DL (ref 3.5–5)
ALBUMIN/GLOB SERPL: 1 (ref 1.1–2.2)
ALP SERPL-CCNC: 86 U/L (ref 45–117)
ALT SERPL-CCNC: 41 U/L (ref 12–78)
ANION GAP SERPL CALC-SCNC: 4 MMOL/L (ref 2–12)
AST SERPL-CCNC: 25 U/L (ref 15–37)
BASOPHILS # BLD: 0 K/UL (ref 0–0.1)
BASOPHILS NFR BLD: 0 % (ref 0–1)
BILIRUB SERPL-MCNC: 0.4 MG/DL (ref 0.2–1)
BUN SERPL-MCNC: 9 MG/DL (ref 6–20)
BUN/CREAT SERPL: 8 (ref 12–20)
CALCIUM SERPL-MCNC: 10.2 MG/DL (ref 8.5–10.1)
CHLORIDE SERPL-SCNC: 102 MMOL/L (ref 97–108)
CK SERPL-CCNC: 242 U/L (ref 39–308)
CO2 SERPL-SCNC: 31 MMOL/L (ref 21–32)
COMMENT:: NORMAL
CREAT SERPL-MCNC: 1.17 MG/DL (ref 0.7–1.3)
D DIMER PPP FEU-MCNC: 0.37 MG/L FEU (ref 0–0.65)
DIFFERENTIAL METHOD BLD: ABNORMAL
EOSINOPHIL # BLD: 0.1 K/UL (ref 0–0.4)
EOSINOPHIL NFR BLD: 1 % (ref 0–7)
ERYTHROCYTE [DISTWIDTH] IN BLOOD BY AUTOMATED COUNT: 13.6 % (ref 11.5–14.5)
GLOBULIN SER CALC-MCNC: 4.3 G/DL (ref 2–4)
GLUCOSE SERPL-MCNC: 102 MG/DL (ref 65–100)
HCT VFR BLD AUTO: 48.1 % (ref 36.6–50.3)
HGB BLD-MCNC: 15.8 G/DL (ref 12.1–17)
IMM GRANULOCYTES # BLD AUTO: 0.1 K/UL (ref 0–0.04)
IMM GRANULOCYTES NFR BLD AUTO: 1 % (ref 0–0.5)
LYMPHOCYTES # BLD: 1.9 K/UL (ref 0.8–3.5)
LYMPHOCYTES NFR BLD: 19 % (ref 12–49)
MCH RBC QN AUTO: 27.9 PG (ref 26–34)
MCHC RBC AUTO-ENTMCNC: 32.8 G/DL (ref 30–36.5)
MCV RBC AUTO: 85 FL (ref 80–99)
MONOCYTES # BLD: 0.8 K/UL (ref 0–1)
MONOCYTES NFR BLD: 8 % (ref 5–13)
NEUTS SEG # BLD: 7.2 K/UL (ref 1.8–8)
NEUTS SEG NFR BLD: 71 % (ref 32–75)
NRBC # BLD: 0 K/UL (ref 0–0.01)
NRBC BLD-RTO: 0 PER 100 WBC
PLATELET # BLD AUTO: 303 K/UL (ref 150–400)
PMV BLD AUTO: 9.4 FL (ref 8.9–12.9)
POTASSIUM SERPL-SCNC: 3.8 MMOL/L (ref 3.5–5.1)
PROT SERPL-MCNC: 8.7 G/DL (ref 6.4–8.2)
RBC # BLD AUTO: 5.66 M/UL (ref 4.1–5.7)
SODIUM SERPL-SCNC: 137 MMOL/L (ref 136–145)
SPECIMEN HOLD: NORMAL
TROPONIN I SERPL HS-MCNC: 6 NG/L (ref 0–76)
WBC # BLD AUTO: 10 K/UL (ref 4.1–11.1)

## 2024-11-08 PROCEDURE — 96375 TX/PRO/DX INJ NEW DRUG ADDON: CPT

## 2024-11-08 PROCEDURE — 80053 COMPREHEN METABOLIC PANEL: CPT

## 2024-11-08 PROCEDURE — 36415 COLL VENOUS BLD VENIPUNCTURE: CPT

## 2024-11-08 PROCEDURE — 82550 ASSAY OF CK (CPK): CPT

## 2024-11-08 PROCEDURE — 84484 ASSAY OF TROPONIN QUANT: CPT

## 2024-11-08 PROCEDURE — 96374 THER/PROPH/DIAG INJ IV PUSH: CPT

## 2024-11-08 PROCEDURE — 71046 X-RAY EXAM CHEST 2 VIEWS: CPT

## 2024-11-08 PROCEDURE — 85025 COMPLETE CBC W/AUTO DIFF WBC: CPT

## 2024-11-08 PROCEDURE — 2500000003 HC RX 250 WO HCPCS: Performed by: EMERGENCY MEDICINE

## 2024-11-08 PROCEDURE — 93005 ELECTROCARDIOGRAM TRACING: CPT | Performed by: EMERGENCY MEDICINE

## 2024-11-08 PROCEDURE — 6360000002 HC RX W HCPCS: Performed by: EMERGENCY MEDICINE

## 2024-11-08 PROCEDURE — 99285 EMERGENCY DEPT VISIT HI MDM: CPT

## 2024-11-08 PROCEDURE — 85379 FIBRIN DEGRADATION QUANT: CPT

## 2024-11-08 RX ORDER — ENALAPRILAT 1.25 MG/ML
2.5 INJECTION INTRAVENOUS
Status: COMPLETED | OUTPATIENT
Start: 2024-11-08 | End: 2024-11-08

## 2024-11-08 RX ORDER — KETOROLAC TROMETHAMINE 30 MG/ML
30 INJECTION, SOLUTION INTRAMUSCULAR; INTRAVENOUS
Status: COMPLETED | OUTPATIENT
Start: 2024-11-08 | End: 2024-11-08

## 2024-11-08 RX ORDER — HYDROCHLOROTHIAZIDE 12.5 MG/1
25 CAPSULE ORAL EVERY MORNING
Qty: 30 CAPSULE | Refills: 1 | Status: SHIPPED | OUTPATIENT
Start: 2024-11-08

## 2024-11-08 RX ADMIN — ENALAPRILAT 2.5 MG: 2.5 INJECTION INTRAVENOUS at 22:55

## 2024-11-08 RX ADMIN — KETOROLAC TROMETHAMINE 30 MG: 30 INJECTION, SOLUTION INTRAMUSCULAR at 21:57

## 2024-11-08 ASSESSMENT — LIFESTYLE VARIABLES
HOW MANY STANDARD DRINKS CONTAINING ALCOHOL DO YOU HAVE ON A TYPICAL DAY: PATIENT DOES NOT DRINK
HOW OFTEN DO YOU HAVE A DRINK CONTAINING ALCOHOL: NEVER

## 2024-11-08 ASSESSMENT — PAIN - FUNCTIONAL ASSESSMENT: PAIN_FUNCTIONAL_ASSESSMENT: 0-10

## 2024-11-08 ASSESSMENT — PAIN SCALES - GENERAL
PAINLEVEL_OUTOF10: 8
PAINLEVEL_OUTOF10: 6

## 2024-11-08 ASSESSMENT — PAIN DESCRIPTION - DESCRIPTORS: DESCRIPTORS: SHARP

## 2024-11-08 ASSESSMENT — PAIN DESCRIPTION - LOCATION: LOCATION: CHEST

## 2024-11-09 LAB
EKG ATRIAL RATE: 111 BPM
EKG DIAGNOSIS: NORMAL
EKG P AXIS: 53 DEGREES
EKG P-R INTERVAL: 168 MS
EKG Q-T INTERVAL: 330 MS
EKG QRS DURATION: 86 MS
EKG QTC CALCULATION (BAZETT): 448 MS
EKG R AXIS: 80 DEGREES
EKG T AXIS: 39 DEGREES
EKG VENTRICULAR RATE: 111 BPM

## 2024-11-09 PROCEDURE — 93010 ELECTROCARDIOGRAM REPORT: CPT | Performed by: INTERNAL MEDICINE

## 2024-11-09 NOTE — DISCHARGE INSTRUCTIONS
Please see your doctor in 1 week and have your blood pressure rechecked.  I have doubled your dose of hydrochlorothiazide to 25 mg daily.

## 2024-11-09 NOTE — ED PROVIDER NOTES
[facility] EMERGENCY DEPT  EMERGENCY DEPARTMENT ENCOUNTER      Pt Name: Johnie Hsu  MRN: [id number]  Birthdate 1990  Date of evaluation: 11/8/2024  Provider: Willie Martins MD    CHIEF COMPLAINT       Chief Complaint   Patient presents with    Chest Pain         HISTORY OF PRESENT ILLNESS   (Location/Symptom, Timing/Onset, Context/Setting, Quality, Duration, Modifying Factors, Severity)  Note limiting factors.   33-year-old male with a past medical history significant for recently diagnosed hypertension and placed on hydrochlorothiazide who presents to the ER for evaluation for midsternal chest pain described as cramping and pulling sensation, severity 5 out of 10, constant, without any aggravation relieving factor and nonradiating.  Patient denies any fever, headache, sore throat, cough, congestion, nausea, vomiting, diarrhea, constipation, dysuria, dizziness, extremity weakness or numbness, leg pain or swelling, sick contact or recent travel, prior history of the same.            Review of External Medical Records:     Nursing Notes were reviewed.    REVIEW OF SYSTEMS    (2-9 systems for level 4, 10 or more for level 5)     Review of Systems   All other systems reviewed and are negative.      Except as noted above the remainder of the review of systems was reviewed and negative.       PAST MEDICAL HISTORY   No past medical history on file.      SURGICAL HISTORY     No past surgical history on file.      CURRENT MEDICATIONS       Discharge Medication List as of 11/8/2024 11:39 PM          ALLERGIES     Amlodipine    FAMILY HISTORY       Family History   Problem Relation Age of Onset    Diabetes Father     Hypertension Mother           SOCIAL HISTORY       Social History     Socioeconomic History    Marital status: Single   Tobacco Use    Smoking status: Never    Smokeless tobacco: Never   Substance and Sexual Activity    Alcohol use: Yes    Drug use: No     Social Determinants of Health     Housing

## 2024-11-18 ENCOUNTER — OFFICE VISIT (OUTPATIENT)
Facility: CLINIC | Age: 34
End: 2024-11-18
Payer: COMMERCIAL

## 2024-11-18 VITALS
RESPIRATION RATE: 20 BRPM | WEIGHT: 283.2 LBS | HEIGHT: 71 IN | HEART RATE: 81 BPM | OXYGEN SATURATION: 97 % | DIASTOLIC BLOOD PRESSURE: 98 MMHG | TEMPERATURE: 97 F | BODY MASS INDEX: 39.65 KG/M2 | SYSTOLIC BLOOD PRESSURE: 154 MMHG

## 2024-11-18 DIAGNOSIS — E78.2 MIXED HYPERLIPIDEMIA: ICD-10-CM

## 2024-11-18 DIAGNOSIS — I10 ESSENTIAL HYPERTENSION: Primary | ICD-10-CM

## 2024-11-18 PROCEDURE — 99214 OFFICE O/P EST MOD 30 MIN: CPT | Performed by: STUDENT IN AN ORGANIZED HEALTH CARE EDUCATION/TRAINING PROGRAM

## 2024-11-18 PROCEDURE — 3080F DIAST BP >= 90 MM HG: CPT | Performed by: STUDENT IN AN ORGANIZED HEALTH CARE EDUCATION/TRAINING PROGRAM

## 2024-11-18 PROCEDURE — 3077F SYST BP >= 140 MM HG: CPT | Performed by: STUDENT IN AN ORGANIZED HEALTH CARE EDUCATION/TRAINING PROGRAM

## 2024-11-18 RX ORDER — ROSUVASTATIN CALCIUM 5 MG/1
5 TABLET, COATED ORAL DAILY
Qty: 90 TABLET | Refills: 0 | Status: SHIPPED | OUTPATIENT
Start: 2024-11-18

## 2024-11-18 RX ORDER — HYDROCHLOROTHIAZIDE 25 MG/1
25 TABLET ORAL EVERY MORNING
Qty: 30 TABLET | Refills: 1 | Status: SHIPPED | OUTPATIENT
Start: 2024-11-18

## 2024-11-18 ASSESSMENT — ENCOUNTER SYMPTOMS
COUGH: 0
SHORTNESS OF BREATH: 0
ABDOMINAL PAIN: 0
VOMITING: 0
RHINORRHEA: 0
NAUSEA: 0

## 2024-11-18 NOTE — PROGRESS NOTES
Assessment/Plan:     Diagnoses and all orders for this visit:    Essential hypertension  -     hydroCHLOROthiazide (HYDRODIURIL) 25 MG tablet; Take 1 tablet by mouth every morning  -Chronic.  Persistently elevated  -Increase hydrochlorothiazide from 12.5 to 25 mg daily  -Monitor home blood pressure readings  -Follow-up in 3 weeks for reevaluation and BMP check or sooner if needed    Mixed hyperlipidemia  -     rosuvastatin (CRESTOR) 5 MG tablet; Take 1 tablet by mouth daily  -Chronic.  Uncontrolled.  -Previously had been on Crestor and tolerated  -Restart rosuvastatin 5 mg daily  -Repeat lipid panel in 3 months       Please note that this dictation was completed with [employer], the [employer] voice recognition software. Quite often unanticipated grammatical, syntax, homophones, and other interpretive errors are inadvertently transcribed by the computer software. Please disregard these errors. Please excuse any errors that have escaped final proofreading.      Return in about 3 weeks (around 12/9/2024), or if symptoms worsen or fail to improve.     Discussed expected course/resolution/complications of diagnosis in detail with patient.    Medication risks/benefits/costs/interactions/alternatives discussed with patient.    Pt expressed understanding with the diagnosis and plan      Subjective:      Johnie Hsu is a 33 y.o. male who presents for had concerns including Follow-up (Patient here for Hypertension follow up./Went to [facility] on 11/08/24 for elevated BP.).     Current Outpatient Medications   Medication Sig Dispense Refill    rosuvastatin (CRESTOR) 5 MG tablet Take 1 tablet by mouth daily 90 tablet 0    hydroCHLOROthiazide (HYDRODIURIL) 25 MG tablet Take 1 tablet by mouth every morning 30 tablet 1     No current facility-administered medications for this visit.       Allergies   Allergen Reactions    Amlodipine Other (See Comments)     \"Feels sick\"     History reviewed. No pertinent past medical history.   No

## 2024-11-18 NOTE — PROGRESS NOTES
dentified pt with two pt identifiers(name and ).    Chief Complaint   Patient presents with    Follow-up     Patient here for Hypertension follow up.  Went to [facility] on 24 for elevated BP.        Health Maintenance Due   Topic    Varicella vaccine (1 of 2 - 13+ 2-dose series)    Hepatitis B vaccine (1 of 3 - 19+ 3-dose series)    DTaP/Tdap/Td vaccine (1 - Tdap)    Flu vaccine (1)    COVID-19 Vaccine ( - - season)       Wt Readings from Last 3 Encounters:   24 128.5 kg (283 lb 3.2 oz)   24 127 kg (280 lb)   10/28/24 129.2 kg (284 lb 12.8 oz)     Temp Readings from Last 3 Encounters:   24 97 °F (36.1 °C) (Temporal)   24 98 °F (36.7 °C) (Oral)   10/28/24 98.8 °F (37.1 °C) (Temporal)     BP Readings from Last 3 Encounters:   24 (!) 176/109   24 (!) 163/94   10/28/24 (!) 165/102     Pulse Readings from Last 3 Encounters:   24 81   24 (!) 113   10/28/24 92           Coordination of Care Questionnaire:  :   1. \"Have you been to the ER, urgent care clinic since your last visit?  Hospitalized since your last visit?\" Yes  [facility]   elevated bp   24    2. \"Have you seen or consulted any other health care providers outside of the [facility] since your last visit?\" no     3. For patients aged 45-75: Has the patient had a colonoscopy / FIT/ Cologuard? no      If the patient is female:    4. For patients aged 40-74: Has the patient had a mammogram within the past 2 years? no      5. For patients aged 21-65: Has the patient had a pap smear? no     3) Do you have an Advance Directive on file? no  Are you interested in receiving information about Advance Directives? no    Patient is accompanied by self I have received verbal consent from Johnie Hsu to discuss any/all medical information while they are present in the room.

## 2024-12-09 ENCOUNTER — OFFICE VISIT (OUTPATIENT)
Facility: CLINIC | Age: 34
End: 2024-12-09
Payer: COMMERCIAL

## 2024-12-09 VITALS
DIASTOLIC BLOOD PRESSURE: 83 MMHG | TEMPERATURE: 97.3 F | HEIGHT: 71 IN | RESPIRATION RATE: 20 BRPM | OXYGEN SATURATION: 98 % | BODY MASS INDEX: 40.32 KG/M2 | HEART RATE: 98 BPM | SYSTOLIC BLOOD PRESSURE: 156 MMHG | WEIGHT: 288 LBS

## 2024-12-09 DIAGNOSIS — I10 ESSENTIAL HYPERTENSION: Primary | ICD-10-CM

## 2024-12-09 PROCEDURE — 3079F DIAST BP 80-89 MM HG: CPT | Performed by: STUDENT IN AN ORGANIZED HEALTH CARE EDUCATION/TRAINING PROGRAM

## 2024-12-09 PROCEDURE — 3077F SYST BP >= 140 MM HG: CPT | Performed by: STUDENT IN AN ORGANIZED HEALTH CARE EDUCATION/TRAINING PROGRAM

## 2024-12-09 PROCEDURE — 99214 OFFICE O/P EST MOD 30 MIN: CPT | Performed by: STUDENT IN AN ORGANIZED HEALTH CARE EDUCATION/TRAINING PROGRAM

## 2024-12-09 RX ORDER — LOSARTAN POTASSIUM 50 MG/1
50 TABLET ORAL DAILY
Qty: 30 TABLET | Refills: 1 | Status: SHIPPED | OUTPATIENT
Start: 2024-12-09

## 2024-12-09 ASSESSMENT — ENCOUNTER SYMPTOMS
ABDOMINAL PAIN: 0
SHORTNESS OF BREATH: 0
NAUSEA: 0
VOMITING: 0
COUGH: 0
RHINORRHEA: 0

## 2024-12-09 NOTE — PROGRESS NOTES
dentified pt with two pt identifiers(name and ).    Chief Complaint   Patient presents with    Hypertension     Patient being seen for hypertension follow up.        Health Maintenance Due   Topic    Varicella vaccine (1 of 2 - 13+ 2-dose series)    Hepatitis B vaccine (1 of 3 - 19+ 3-dose series)    DTaP/Tdap/Td vaccine (1 - Tdap)    Flu vaccine (1)    COVID-19 Vaccine (1 - - season)       Wt Readings from Last 3 Encounters:   24 128.5 kg (283 lb 3.2 oz)   24 127 kg (280 lb)   10/28/24 129.2 kg (284 lb 12.8 oz)     Temp Readings from Last 3 Encounters:   24 97 °F (36.1 °C) (Temporal)   24 98 °F (36.7 °C) (Oral)     BP Readings from Last 3 Encounters:   24 (!) 154/98   24 (!) 163/94   10/28/24 (!) 165/102     Pulse Readings from Last 3 Encounters:   24 81   24 (!) 113   10/28/24 92           Coordination of Care Questionnaire:  :   1. \"Have you been to the ER, urgent care clinic since your last visit?  Hospitalized since your last visit?\" no    2. \"Have you seen or consulted any other health care providers outside of the [facility] since your last visit?\" no     3. For patients aged 45-75: Has the patient had a colonoscopy / FIT/ Cologuard? no      If the patient is female:    4. For patients aged 40-74: Has the patient had a mammogram within the past 2 years? no      5. For patients aged 21-65: Has the patient had a pap smear? no     3) Do you have an Advance Directive on file? no  Are you interested in receiving information about Advance Directives? no    Patient is accompanied by self I have received verbal consent from Johnie Hsu to discuss any/all medical information while they are present in the room.   
Onset    Diabetes Father     Hypertension Mother       Social History     Socioeconomic History    Marital status: Single     Spouse name: Not on file    Number of children: Not on file    Years of education: Not on file    Highest education level: Not on file   Occupational History    Not on file   Tobacco Use    Smoking status: Never    Smokeless tobacco: Never   Substance and Sexual Activity    Alcohol use: Not Currently    Drug use: No    Sexual activity: Yes     Partners: Female   Other Topics Concern    Not on file   Social History Narrative    Not on file     Social Determinants of Health     Financial Resource Strain: Not on file   Food Insecurity: Not on file (10/26/2024)   Transportation Needs: Not on file   Physical Activity: Not on file   Stress: Not on file   Social Connections: Not on file   Intimate Partner Violence: Not on file   Housing Stability: Unknown (10/26/2024)    Housing Stability Vital Sign     Unable to Pay for Housing in the Last Year: Not on file     Number of Times Moved in the Last Year: Not on file     Homeless in the Last Year: No        Patient is a 33-year-old male who presents to the office today for follow-up of hypertension.  Patient has not been able to recently check his blood pressure at home.  His BP is persistently elevated in office today.  He remains on hydrochlorothiazide 25 mg daily.  He has previously had a side effect to amlodipine but has not tried any other blood pressure medications.  He denies any fever, chills, chest pain, shortness of breath or any other acute concerns.  He declines a flu vaccine today.          ROS:   Review of Systems   Constitutional:  Negative for chills and fever.   HENT:  Negative for rhinorrhea.    Respiratory:  Negative for cough and shortness of breath.    Cardiovascular:  Negative for chest pain and leg swelling.   Gastrointestinal:  Negative for abdominal pain, nausea and vomiting.   Neurological:  Negative for dizziness, weakness,

## 2024-12-13 ENCOUNTER — TELEPHONE (OUTPATIENT)
Age: 34
End: 2024-12-13

## 2024-12-13 NOTE — TELEPHONE ENCOUNTER
Called patient regarding referral to our [facility]. Patient did not answer so I left a vmail with our contact information.

## 2025-02-02 ENCOUNTER — HOSPITAL ENCOUNTER (EMERGENCY)
Facility: HOSPITAL | Age: 35
Discharge: HOME | End: 2025-02-02
Attending: EMERGENCY MEDICINE
Payer: COMMERCIAL

## 2025-02-02 ENCOUNTER — APPOINTMENT (OUTPATIENT)
Facility: HOSPITAL | Age: 35
End: 2025-02-02
Payer: COMMERCIAL

## 2025-02-02 VITALS
WEIGHT: 280 LBS | HEART RATE: 97 BPM | TEMPERATURE: 100 F | BODY MASS INDEX: 39.2 KG/M2 | HEIGHT: 71 IN | SYSTOLIC BLOOD PRESSURE: 148 MMHG | RESPIRATION RATE: 16 BRPM | OXYGEN SATURATION: 93 % | DIASTOLIC BLOOD PRESSURE: 91 MMHG

## 2025-02-02 DIAGNOSIS — J18.9 PNEUMONIA OF BOTH LUNGS DUE TO INFECTIOUS ORGANISM, UNSPECIFIED PART OF LUNG: Primary | ICD-10-CM

## 2025-02-02 DIAGNOSIS — R50.9 FEVER, UNSPECIFIED FEVER CAUSE: ICD-10-CM

## 2025-02-02 LAB
FLUAV RNA SPEC QL NAA+PROBE: NOT DETECTED
FLUBV RNA SPEC QL NAA+PROBE: NOT DETECTED
SARS-COV-2 RNA RESP QL NAA+PROBE: NOT DETECTED
SOURCE: NORMAL

## 2025-02-02 PROCEDURE — 6370000000 HC RX 637 (ALT 250 FOR IP)

## 2025-02-02 PROCEDURE — 71046 X-RAY EXAM CHEST 2 VIEWS: CPT

## 2025-02-02 PROCEDURE — 99284 EMERGENCY DEPT VISIT MOD MDM: CPT

## 2025-02-02 PROCEDURE — 87636 SARSCOV2 & INF A&B AMP PRB: CPT

## 2025-02-02 PROCEDURE — 6370000000 HC RX 637 (ALT 250 FOR IP): Performed by: EMERGENCY MEDICINE

## 2025-02-02 RX ORDER — ACETAMINOPHEN 500 MG
1000 TABLET ORAL ONCE
Status: COMPLETED | OUTPATIENT
Start: 2025-02-02 | End: 2025-02-02

## 2025-02-02 RX ORDER — IBUPROFEN 800 MG/1
800 TABLET, FILM COATED ORAL
Status: COMPLETED | OUTPATIENT
Start: 2025-02-02 | End: 2025-02-02

## 2025-02-02 RX ORDER — AMOXICILLIN 500 MG/1
1000 TABLET, FILM COATED ORAL 3 TIMES DAILY
Qty: 30 TABLET | Refills: 0 | Status: SHIPPED | OUTPATIENT
Start: 2025-02-02 | End: 2025-02-07

## 2025-02-02 RX ORDER — AZITHROMYCIN 250 MG/1
TABLET, FILM COATED ORAL
Qty: 6 TABLET | Refills: 0 | Status: SHIPPED | OUTPATIENT
Start: 2025-02-02 | End: 2025-02-12

## 2025-02-02 RX ADMIN — ACETAMINOPHEN 1000 MG: 500 TABLET ORAL at 20:10

## 2025-02-02 RX ADMIN — IBUPROFEN 800 MG: 800 TABLET, FILM COATED ORAL at 19:20

## 2025-02-02 ASSESSMENT — PAIN - FUNCTIONAL ASSESSMENT: PAIN_FUNCTIONAL_ASSESSMENT: NONE - DENIES PAIN

## 2025-02-02 NOTE — ED TRIAGE NOTES
Patient arrived ambulatory via POV with cc dx flu last Tuesday, was feeling better and now feeling worse again. Sts cough, fever/chills

## 2025-02-03 NOTE — ED PROVIDER NOTES
[facility] EMERGENCY DEPARTMENT  EMERGENCY DEPARTMENT ENCOUNTER      Date: 2/2/2025  Patient Name: Johnie Hsu  MRN: [id number]  Birthdate 1990  Date of evaluation: 2/2/2025  Provider: ART Brennan NP   Note Started: 7:56 PM EST 2/2/25    CHIEF COMPLAINT   No chief complaint on file.      HISTORY OF PRESENT ILLNESS  (Onset, Location, Duration, Character, Alleviating/Aggravating, Radiation, Timing, Severity)   Note limiting factors.   History Provided By: Patient     HPI: Johnie Hsu is a 34 y.o. male with no reported past medical history presents with cough, congestion, rhinorrhea, and chills.  States he developed the symptoms 5 days ago at which point he was diagnosed with influenza.  States he began to feel better until 2 days ago when symptoms returned.  Feels as though he may have gone back to work too soon.  No OTC treatment PTA.  He denies chest pain, difficulty breathing, edema, palpitations, hemoptysis, nausea, vomiting, diarrhea, abdominal pain.  Tolerating p.o. with appropriate output.      Nursing Notes and triage vitals were reviewed.  PCP: Gisselle Jewell MD      PAST MEDICAL HISTORY   Past Medical History:  No past medical history on file.    Past Surgical History:  No past surgical history on file.    Family History:  Family History   Problem Relation Age of Onset    Diabetes Father     Hypertension Mother        Social History:  Social History     Tobacco Use    Smoking status: Never    Smokeless tobacco: Never   Substance Use Topics    Alcohol use: Not Currently    Drug use: No       Allergies:  Allergies   Allergen Reactions    Amlodipine Other (See Comments)     \"Feels sick\"       Current Meds:   No current facility-administered medications for this encounter.     Current Outpatient Medications   Medication Sig Dispense Refill    azithromycin (ZITHROMAX) 250 MG tablet 500mg on day 1 followed by 250mg on days 2 - 5 6 tablet 0    amoxicillin

## 2025-02-03 NOTE — DISCHARGE INSTRUCTIONS
Thank you for allowing us to provide you with medical care today.  We realize that you have many choices for your emergency care needs.  We thank you for choosing [facility].  Please choose us in the future for any continued health care needs.     We hope we addressed all of your medical concerns. We strive to provide excellent quality care in the Emergency Department.  Anything less than excellent does not meet our expectations.     The exam and treatment you received in the Emergency Department were for an emergent problem and are not intended as complete care. It is important that you follow up with a doctor, nurse practitioner, or physician’s assistant for ongoing care. If your symptoms worsen or you do not improve as expected and you are unable to reach your usual health care provider, you should return to the Emergency Department. We are available 24 hours a day.     Take this sheet with you when you go to your follow-up visit.     If you have any problem arranging the follow-up visit, contact the Emergency Department immediately.     Make an appointment your family doctor for follow up of this visit. Return to the ER if you are unable to be seen in a timely manner.     Below is a summary of your results.    Labs  No results found for this or any previous visit (from the past 12 hour(s)).    Radiologic Studies  XR CHEST (2 VW)   Final Result   Mild interstitial opacities suggestive of viral pneumonia      Electronically signed by Kingsley Colmenares

## 2025-04-08 ENCOUNTER — TELEPHONE (OUTPATIENT)
Age: 35
End: 2025-04-08

## 2025-04-08 NOTE — TELEPHONE ENCOUNTER
Patient has been sent the link for our pre-recorded [facility] Medical Weight Loss Orientation. Patient is required to register, view the recording, call insurance to verify benefits, then send an email to the  to schedule a consultation.